# Patient Record
Sex: FEMALE | Race: WHITE | NOT HISPANIC OR LATINO
[De-identification: names, ages, dates, MRNs, and addresses within clinical notes are randomized per-mention and may not be internally consistent; named-entity substitution may affect disease eponyms.]

---

## 2019-03-22 PROBLEM — Z00.00 ENCOUNTER FOR PREVENTIVE HEALTH EXAMINATION: Status: ACTIVE | Noted: 2019-03-22

## 2019-04-15 ENCOUNTER — APPOINTMENT (OUTPATIENT)
Dept: VASCULAR SURGERY | Facility: CLINIC | Age: 79
End: 2019-04-15
Payer: COMMERCIAL

## 2019-04-15 VITALS — HEIGHT: 61 IN | BODY MASS INDEX: 28.32 KG/M2 | WEIGHT: 150 LBS

## 2019-04-15 DIAGNOSIS — Z87.39 PERSONAL HISTORY OF OTHER DISEASES OF THE MUSCULOSKELETAL SYSTEM AND CONNECTIVE TISSUE: ICD-10-CM

## 2019-04-15 DIAGNOSIS — Z78.9 OTHER SPECIFIED HEALTH STATUS: ICD-10-CM

## 2019-04-15 DIAGNOSIS — Z86.79 PERSONAL HISTORY OF OTHER DISEASES OF THE CIRCULATORY SYSTEM: ICD-10-CM

## 2019-04-15 DIAGNOSIS — Z82.61 FAMILY HISTORY OF ARTHRITIS: ICD-10-CM

## 2019-04-15 DIAGNOSIS — Z86.39 PERSONAL HISTORY OF OTHER ENDOCRINE, NUTRITIONAL AND METABOLIC DISEASE: ICD-10-CM

## 2019-04-15 DIAGNOSIS — Z80.9 FAMILY HISTORY OF MALIGNANT NEOPLASM, UNSPECIFIED: ICD-10-CM

## 2019-04-15 DIAGNOSIS — I83.93 ASYMPTOMATIC VARICOSE VEINS OF BILATERAL LOWER EXTREMITIES: ICD-10-CM

## 2019-04-15 PROCEDURE — 99243 OFF/OP CNSLTJ NEW/EST LOW 30: CPT

## 2019-04-15 PROCEDURE — XXXXX: CPT

## 2019-04-15 RX ORDER — ATORVASTATIN CALCIUM 80 MG/1
TABLET, FILM COATED ORAL
Refills: 0 | Status: ACTIVE | COMMUNITY

## 2019-04-15 NOTE — PHYSICAL EXAM
[JVD] : no jugular venous distention  [Normal Breath Sounds] : Normal breath sounds [Ankle Swelling (On Exam)] : not present [Ankle Swelling Bilaterally] : bilaterally  [Varicose Veins Of Lower Extremities] : bilaterally [Ankle Swelling On The Right] : mild [] : bilaterally [Skin Ulcer] : no ulcer [Alert] : alert [Oriented to Person] : oriented to person [Oriented to Place] : oriented to place [Oriented to Time] : oriented to time [de-identified] : Awake and Alert [de-identified] : spider veins bl [de-identified] : appopriate

## 2019-04-15 NOTE — HISTORY OF PRESENT ILLNESS
[FreeTextEntry1] : 79 yo female referred to Dr. Carlton with a chief complaint of spider veins. She reports that she finds them unsightly. She denies pain or swelling associated with her spider veins. She is s/p sclerotherapy many years ago. She is interested in undergoing sclerotherapy. She denies a history of DVT or SVT. She does not currently wear compression stockings.

## 2019-04-15 NOTE — REVIEW OF SYSTEMS
[Fever] : no fever [Chills] : no chills [Limb Pain] : no limb pain [Limb Swelling] : no limb swelling [Limb Weakness] : no limb weakness [Difficulty Walking] : no difficulty walking

## 2019-04-15 NOTE — CONSULT LETTER
[Dear  ___] : Dear ~JESSICA, [Consult Letter:] : I had the pleasure of evaluating your patient, [unfilled]. [Please see my note below.] : Please see my note below. [Consult Closing:] : Thank you very much for allowing me to participate in the care of this patient.  If you have any questions, please do not hesitate to contact me. [Sincerely,] : Sincerely, [FreeTextEntry2] : Marlon Carlton [FreeTextEntry3] : Oliver

## 2019-06-24 ENCOUNTER — APPOINTMENT (OUTPATIENT)
Dept: VASCULAR SURGERY | Facility: CLINIC | Age: 79
End: 2019-06-24
Payer: COMMERCIAL

## 2019-06-24 PROCEDURE — 36468 NJX SCLRSNT SPIDER VEINS: CPT

## 2019-06-24 NOTE — PROCEDURE
[FreeTextEntry1] : bilateral sclerotherapy [FreeTextEntry2] : spider veins [FreeTextEntry3] : After consent was obtained. The patient underwent bilateral lower extremity sclerotherapy with 1% Polidocanol. The Polidocanol was foamed as necessary. The patient tolerated the procedure well. The patient was placed in compression stockings bilaterally. The patient was instructed to wear compression stockings continuously for the next 2 days and on a daily basis for 2 weeks. The patient will followup in 3 -4 weeks sooner if they develop a problem.\par

## 2019-07-22 ENCOUNTER — APPOINTMENT (OUTPATIENT)
Dept: VASCULAR SURGERY | Facility: CLINIC | Age: 79
End: 2019-07-22
Payer: COMMERCIAL

## 2019-07-22 PROCEDURE — 36468 NJX SCLRSNT SPIDER VEINS: CPT

## 2019-07-22 NOTE — PROCEDURE
[FreeTextEntry1] : sclerotherapy [FreeTextEntry2] : spider veins [FreeTextEntry3] : After consent was obtained. The patient underwent bilateral lower extremity sclerotherapy with 1% Polidocanol. The patient tolerated the procedure well. The patient was placed in compression stockings bilaterally. The patient was instructed to wear compression stockings continuously for the next 3 days and on a daily basis for 2 weeks. The patient will followup in 6-8 weeks sooner if they develop a problem.\par

## 2019-08-26 ENCOUNTER — APPOINTMENT (OUTPATIENT)
Dept: VASCULAR SURGERY | Facility: CLINIC | Age: 79
End: 2019-08-26
Payer: COMMERCIAL

## 2019-08-26 PROCEDURE — 36471 NJX SCLRSNT MLT INCMPTNT VN: CPT

## 2019-08-26 NOTE — PROCEDURE
[FreeTextEntry1] : sclerotherapy [FreeTextEntry2] : spider veins [FreeTextEntry3] : After consent was obtained. The patient underwent bilateral lower extremity sclerotherapy with 1% Polidocanol. The patient tolerated the procedure well. The patient was placed in compression stockings bilaterally. The patient was instructed to wear compression stockings continuously for the next 3 days and on a daily basis for 2 weeks. The patient will followup in 4 weeks sooner if they develop a problem for another session of sclerotherapy\par

## 2019-08-26 NOTE — ADDENDUM
[FreeTextEntry1] : Patient tolerated the session well. She verbalized understanding of the post sclerotherapy instructions.

## 2019-09-05 ENCOUNTER — TRANSCRIPTION ENCOUNTER (OUTPATIENT)
Age: 79
End: 2019-09-05

## 2019-10-07 ENCOUNTER — APPOINTMENT (OUTPATIENT)
Dept: VASCULAR SURGERY | Facility: CLINIC | Age: 79
End: 2019-10-07
Payer: COMMERCIAL

## 2019-10-07 PROCEDURE — 36471 NJX SCLRSNT MLT INCMPTNT VN: CPT

## 2019-10-07 NOTE — ADDENDUM
[FreeTextEntry1] : Patient tolerated the procedure well. She verbalized understanding of the post procedure instructions.

## 2019-10-07 NOTE — PROCEDURE
[FreeTextEntry2] : Spider Veins right leg [FreeTextEntry3] : After consent was obtained. The patient underwent right lower extremity sclerotherapy with 1% Polidocanol. The patient tolerated the procedure well. The patient was placed in a compression stockings. The patient was instructed to wear compression stockings continuously for the next 2 days and on a daily basis for 2 weeks. The patient will followup in 2 weeks sooner if they develop a problem.\par  [FreeTextEntry1] : Sclerotherapy

## 2019-10-14 ENCOUNTER — APPOINTMENT (OUTPATIENT)
Dept: VASCULAR SURGERY | Facility: CLINIC | Age: 79
End: 2019-10-14
Payer: COMMERCIAL

## 2019-10-14 VITALS
HEIGHT: 61 IN | HEART RATE: 65 BPM | WEIGHT: 150 LBS | BODY MASS INDEX: 28.32 KG/M2 | SYSTOLIC BLOOD PRESSURE: 119 MMHG | DIASTOLIC BLOOD PRESSURE: 80 MMHG

## 2019-10-14 PROCEDURE — 99212 OFFICE O/P EST SF 10 MIN: CPT

## 2019-10-15 NOTE — HISTORY OF PRESENT ILLNESS
[FreeTextEntry1] : 78 yo female with spider veins. She is s/p multiple treatments. She is wearing compression stockings as directed. She reports that she has some areas of staining post recent treatment. She is here for a follow up evaluation.

## 2019-10-15 NOTE — REVIEW OF SYSTEMS
[Fever] : no fever [Chills] : no chills [Lower Ext Edema] : no lower extremity edema [Limb Pain] : no limb pain [Limb Swelling] : no limb swelling

## 2019-10-15 NOTE — PHYSICAL EXAM
[JVD] : no jugular venous distention  [Normal Breath Sounds] : Normal breath sounds [Normal Rate and Rhythm] : normal rate and rhythm [2+] : left 2+ [Ankle Swelling (On Exam)] : not present [Ankle Swelling Bilaterally] : bilaterally  [Varicose Veins Of Lower Extremities] : bilaterally [Ankle Swelling On The Right] : mild [Alert] : alert [Oriented to Person] : oriented to person [Oriented to Place] : oriented to place [Oriented to Time] : oriented to time [de-identified] : Awake and Alert [de-identified] : spider veins bl improving, some staining, no ulcerations [de-identified] : appropriate

## 2019-10-15 NOTE — ASSESSMENT
[FreeTextEntry1] : 78 yo female s/p multiple sclerotherapy treatments. She is doing well she has some staining on the lateral aspect of the right leg. She will continue to wear compression stockings daily for the next week. She will follow up in several weeks for repeat treatment.\par \par

## 2019-10-28 ENCOUNTER — APPOINTMENT (OUTPATIENT)
Dept: VASCULAR SURGERY | Facility: CLINIC | Age: 79
End: 2019-10-28
Payer: COMMERCIAL

## 2019-10-28 PROCEDURE — 36470 NJX SCLRSNT 1 INCMPTNT VEIN: CPT

## 2019-10-29 NOTE — PROCEDURE
[FreeTextEntry1] : Sclerotherapy left leg [FreeTextEntry2] : spider veins [FreeTextEntry3] : After consent was obtained. The patient underwent left lower extremity sclerotherapy with 0.5% Polidocanol. The patient tolerated the procedure well. The patient was placed in a compression stocking on the left. The patient was instructed to wear a compression stockings continuously for the next 2 days and on a daily basis for 2 weeks. The patient will followup in 4 weeks sooner if they develop a problem.\par

## 2019-11-25 ENCOUNTER — APPOINTMENT (OUTPATIENT)
Dept: VASCULAR SURGERY | Facility: CLINIC | Age: 79
End: 2019-11-25
Payer: COMMERCIAL

## 2019-11-25 PROCEDURE — 36471 NJX SCLRSNT MLT INCMPTNT VN: CPT | Mod: 50,LT,RT

## 2019-11-25 NOTE — PROCEDURE
[FreeTextEntry1] : Sclerotherapy [FreeTextEntry2] : Spider Veins [FreeTextEntry3] : After consent was obtained. The patient underwent bilateral lower extremity sclerotherapy with 0.5% Polidocanol. The patient tolerated the procedure well. The patient was placed in compression stockings bilaterally. The patient was instructed to wear compression stockings continuously for the next 3 days and on a daily basis for 2 weeks. The patient will followup in 6-8 weeks sooner if they develop a problem.\par

## 2020-01-13 ENCOUNTER — APPOINTMENT (OUTPATIENT)
Dept: VASCULAR SURGERY | Facility: CLINIC | Age: 80
End: 2020-01-13
Payer: COMMERCIAL

## 2020-01-13 PROCEDURE — 99213 OFFICE O/P EST LOW 20 MIN: CPT

## 2020-01-13 NOTE — ASSESSMENT
[FreeTextEntry1] : 80 yo female s/p multiple sclerotherapy treatments. She has an excellent result on the left.  She has some staining on the lateral aspect of the right leg and still has some prominent veins on the posterior aspect of the right leg behind the knee. I think she would benefit from an additional session of sclerotherapy. The patient agrees with the plan.\par \par

## 2020-01-13 NOTE — HISTORY OF PRESENT ILLNESS
[FreeTextEntry1] : 78 yo female with spider veins. She is s/p multiple treatments. She is happy with the results on the left. She reports that she has some areas of staining on the right and some continued areas of spider and reticular veins on the right. She is here to discuss additional treatment options.

## 2020-01-13 NOTE — PHYSICAL EXAM
[JVD] : no jugular venous distention  [Normal Breath Sounds] : Normal breath sounds [Normal Rate and Rhythm] : normal rate and rhythm [2+] : right 2+ [Ankle Swelling (On Exam)] : not present [Ankle Swelling Bilaterally] : bilaterally  [Varicose Veins Of Lower Extremities] : bilaterally [Ankle Swelling On The Right] : mild [Alert] : alert [Oriented to Person] : oriented to person [Oriented to Place] : oriented to place [Oriented to Time] : oriented to time [de-identified] : Awake and Alert [de-identified] : spider veins right posterior knee,  some staining right lateral leg no ulcerations [de-identified] : appropriate

## 2020-07-15 ENCOUNTER — RESULT REVIEW (OUTPATIENT)
Age: 80
End: 2020-07-15

## 2021-04-12 ENCOUNTER — APPOINTMENT (OUTPATIENT)
Dept: SURGERY | Facility: CLINIC | Age: 81
End: 2021-04-12
Payer: MEDICARE

## 2021-04-12 VITALS
WEIGHT: 137 LBS | SYSTOLIC BLOOD PRESSURE: 127 MMHG | BODY MASS INDEX: 25.86 KG/M2 | HEIGHT: 61 IN | TEMPERATURE: 97.2 F | HEART RATE: 69 BPM | DIASTOLIC BLOOD PRESSURE: 76 MMHG

## 2021-04-12 PROCEDURE — 99205 OFFICE O/P NEW HI 60 MIN: CPT

## 2021-04-12 RX ORDER — MULTIVITAMIN
TABLET ORAL
Refills: 0 | Status: ACTIVE | COMMUNITY

## 2021-04-12 RX ORDER — ESTRADIOL 0.1 MG/G
0.1 CREAM VAGINAL
Refills: 0 | Status: ACTIVE | COMMUNITY

## 2021-04-12 RX ORDER — BLOOD SUGAR DIAGNOSTIC
100 STRIP MISCELLANEOUS
Refills: 0 | Status: ACTIVE | COMMUNITY

## 2021-04-12 RX ORDER — GLUCOSAMINE SULFATE 500 MG
CAPSULE ORAL
Refills: 0 | Status: ACTIVE | COMMUNITY

## 2021-04-12 RX ORDER — TURMERIC ROOT EXTRACT 500 MG
TABLET ORAL
Refills: 0 | Status: ACTIVE | COMMUNITY

## 2021-04-12 RX ORDER — COLD-HOT PACK
EACH MISCELLANEOUS
Refills: 0 | Status: ACTIVE | COMMUNITY

## 2021-04-12 RX ORDER — PSYLLIUM HUSK 0.4 G
CAPSULE ORAL
Refills: 0 | Status: ACTIVE | COMMUNITY

## 2021-04-12 RX ORDER — ALENDRONATE SODIUM 70 MG/1
70 TABLET ORAL
Refills: 0 | Status: ACTIVE | COMMUNITY

## 2021-04-12 NOTE — CONSULT LETTER
[Dear  ___] : Dear  [unfilled], [( Thank you for referring [unfilled] for consultation for _____ )] : Thank you for referring [unfilled] for consultation for [unfilled] [Please see my note below.] : Please see my note below. [Consult Closing:] : Thank you very much for allowing me to participate in the care of this patient.  If you have any questions, please do not hesitate to contact me. [Sincerely,] : Sincerely, [DrOsito  ___] : Dr. FISCHER [FreeTextEntry3] : Olivia Lawrence MD

## 2021-04-12 NOTE — ASSESSMENT
[FreeTextEntry1] : 81 yo F with mild primary hyperparathyroidism with worsening bone density. \par We discussed the indications for surgery, the anatomy and physiology of the parathyroid glands as well as their pathophysiology at length. We discussed the utility of preoperative localization studies including US, sestamibi scan and 4D CT. We discussed the risks and benefits of surgery including short and long-term. \par \par I have recommended parathyroidectomy with intraoperative PTH monitoring. The patient is in agreement. \par I would like to obtain a 4D CT and possibly NM imaging prior to surgery to better direct my approach.\par Surgery would be performed as an ambulatory (or possibly overnight stay) procedure at [facility].\par Patient will require preop medical clearance and COVID testing as well.

## 2021-04-12 NOTE — PHYSICAL EXAM
[Normal Thyroid] : the thyroid was normal [Respiratory Effort] : normal respiratory effort [Normal Rate and Rhythm] : normal rate and rhythm [No Rash or Lesion] : No rash or lesion [Alert] : alert [Calm] : calm [de-identified] : NAD, well-appearing  [de-identified] : Anicteric, MMM [de-identified] : Bedside ultrasound demonstrated no thyroid nodules; there was a small hypoechoic area posterior to the left lower pole, possibly representing an enlarged parathyroid [de-identified] : soft, nontender, nondistended

## 2021-04-12 NOTE — HISTORY OF PRESENT ILLNESS
[de-identified] : 81 yo F referred by Dr. Arredondo for primary hyperparthyroidism. The patient has been noted to have borderline elevated calcium for ~ 2 years with concurrently elevated or inappropriately high PTH. She tried diet modifications which helped decrease her calcium somewhat but most recent labs on 3/30/21 show a Calcium of 11 and PTH of 67. Vitamin D 25 OH has been on the lower end  of normal. She had 24 hour urine calcium that was elevated at 184 in June 2020. Bone density from 2019 shows osteopenia with T-2.4 in the Lspine, T-1.5 in fem neck and a new T12 compression fracture. She denies kidney stones. She reports some fatigue but overall her energy is improved after she intentionally lost 20 lbs with diet and exercise. She has some constipation and frequent urination but also tried to drink lots of water. She has not noticed issues with her memory apart from what she attributes to normal aging. US 6/2020 showed no evidence of thyroid nodules or parathyroid adenomas.

## 2021-04-26 ENCOUNTER — RESULT REVIEW (OUTPATIENT)
Age: 81
End: 2021-04-26

## 2021-06-17 ENCOUNTER — APPOINTMENT (OUTPATIENT)
Dept: SURGERY | Facility: HOSPITAL | Age: 81
End: 2021-06-17

## 2021-06-28 ENCOUNTER — APPOINTMENT (OUTPATIENT)
Dept: SURGERY | Facility: CLINIC | Age: 81
End: 2021-06-28
Payer: MEDICARE

## 2021-06-28 VITALS
HEIGHT: 61 IN | HEART RATE: 60 BPM | SYSTOLIC BLOOD PRESSURE: 123 MMHG | BODY MASS INDEX: 26.81 KG/M2 | TEMPERATURE: 97.3 F | WEIGHT: 142 LBS | DIASTOLIC BLOOD PRESSURE: 81 MMHG

## 2021-06-28 DIAGNOSIS — E21.0 PRIMARY HYPERPARATHYROIDISM: ICD-10-CM

## 2021-06-28 PROCEDURE — 99024 POSTOP FOLLOW-UP VISIT: CPT

## 2021-06-29 LAB
25(OH)D3 SERPL-MCNC: 49.2 NG/ML
CALCIUM SERPL-MCNC: 9.9 MG/DL
CALCIUM SERPL-MCNC: 9.9 MG/DL
PARATHYROID HORMONE INTACT: 31 PG/ML

## 2021-06-29 NOTE — CONSULT LETTER
[Dear  ___] : Dear  [unfilled], [Courtesy Letter:] : I had the pleasure of seeing your patient, [unfilled], in my office today. [Consult Closing:] : Thank you very much for allowing me to participate in the care of this patient.  If you have any questions, please do not hesitate to contact me. [Sincerely,] : Sincerely, [DrOsito  ___] : Dr. FISCHER [FreeTextEntry1] : Enclosed please find my operative, pathology and postop visit notes below. [FreeTextEntry3] : Olivia Lawrence MD

## 2021-06-29 NOTE — HISTORY OF PRESENT ILLNESS
[de-identified] : Patient returns postop s/p left superior parathyroidectomy on 6/17/21. Her Preop PTH was 71.5 and 10 minutes post excision, PTH was 38.9. She is feeling more energetic. She denies pain, fevers, chills. She denies numbness or tingling. Her incision is healing well.

## 2021-06-29 NOTE — ASSESSMENT
[FreeTextEntry1] : Appropriate recovery s/p parathyroidectomy.\par Will obtain labs today\par Return to normal activity as tolerated\par Follow up with Dr. Arredondo as scheduled\par Follow up with me in 1 month.

## 2021-06-29 NOTE — PHYSICAL EXAM
[Respiratory Effort] : normal respiratory effort [Normal Rate and Rhythm] : normal rate and rhythm [No Rash or Lesion] : No rash or lesion [Alert] : alert [Calm] : calm [de-identified] : NAD, well-appearing [de-identified] : Anicteric, MMM [de-identified] : Well-healing incision with minor residual edema, no evidence of infection

## 2023-01-17 ENCOUNTER — APPOINTMENT (OUTPATIENT)
Dept: INTERNAL MEDICINE | Facility: CLINIC | Age: 83
End: 2023-01-17
Payer: MEDICARE

## 2023-01-17 PROCEDURE — 36415 COLL VENOUS BLD VENIPUNCTURE: CPT

## 2023-01-17 PROCEDURE — G0439: CPT

## 2023-01-27 ENCOUNTER — APPOINTMENT (OUTPATIENT)
Dept: INTERNAL MEDICINE | Facility: CLINIC | Age: 83
End: 2023-01-27
Payer: MEDICARE

## 2023-01-27 PROCEDURE — 90746 HEPB VACCINE 3 DOSE ADULT IM: CPT

## 2023-01-27 PROCEDURE — G0010: CPT

## 2023-01-27 PROCEDURE — 99213 OFFICE O/P EST LOW 20 MIN: CPT

## 2023-01-27 PROCEDURE — 36415 COLL VENOUS BLD VENIPUNCTURE: CPT

## 2023-11-13 ENCOUNTER — APPOINTMENT (OUTPATIENT)
Dept: VASCULAR SURGERY | Facility: CLINIC | Age: 83
End: 2023-11-13
Payer: MEDICARE

## 2023-11-13 VITALS — BODY MASS INDEX: 29.06 KG/M2 | HEIGHT: 59.75 IN | WEIGHT: 148 LBS

## 2023-11-13 PROCEDURE — 99203 OFFICE O/P NEW LOW 30 MIN: CPT

## 2023-12-11 ENCOUNTER — APPOINTMENT (OUTPATIENT)
Dept: VASCULAR SURGERY | Facility: CLINIC | Age: 83
End: 2023-12-11
Payer: SELF-PAY

## 2023-12-11 VITALS
OXYGEN SATURATION: 96 % | DIASTOLIC BLOOD PRESSURE: 83 MMHG | HEART RATE: 63 BPM | RESPIRATION RATE: 18 BRPM | SYSTOLIC BLOOD PRESSURE: 143 MMHG | HEIGHT: 59.75 IN | BODY MASS INDEX: 29.06 KG/M2 | WEIGHT: 148 LBS

## 2023-12-11 PROCEDURE — 36471 NJX SCLRSNT MLT INCMPTNT VN: CPT

## 2024-01-29 ENCOUNTER — APPOINTMENT (OUTPATIENT)
Dept: VASCULAR SURGERY | Facility: CLINIC | Age: 84
End: 2024-01-29
Payer: SELF-PAY

## 2024-01-29 PROCEDURE — 36471 NJX SCLRSNT MLT INCMPTNT VN: CPT

## 2024-01-30 NOTE — ADDENDUM
[FreeTextEntry1] : She tolerated the procedure. She verbalized understanding of the post procedure instructions.

## 2024-01-30 NOTE — PROCEDURE
[FreeTextEntry1] : Sclerotherapy [FreeTextEntry2] : Spider Veins [FreeTextEntry3] : After consent was obtained. The patient underwent bilateral lower extremity sclerotherapy with 0.5% Polidocanol. The patient tolerated the procedure well. The patient was placed in compression stockings bilaterally. The patient was instructed to wear compression stockings continuously for the next 3 days and on a daily basis for 2 weeks. The patient will follow up in 1 month sooner if they develop a problem.

## 2024-04-08 ENCOUNTER — APPOINTMENT (OUTPATIENT)
Dept: VASCULAR SURGERY | Facility: CLINIC | Age: 84
End: 2024-04-08
Payer: SELF-PAY

## 2024-04-08 VITALS — WEIGHT: 145 LBS | BODY MASS INDEX: 28.47 KG/M2 | HEIGHT: 59.75 IN

## 2024-04-08 DIAGNOSIS — I83.93 ASYMPTOMATIC VARICOSE VEINS OF BILATERAL LOWER EXTREMITIES: ICD-10-CM

## 2024-04-08 PROCEDURE — 36471 NJX SCLRSNT MLT INCMPTNT VN: CPT

## 2024-04-08 NOTE — PROCEDURE
[FreeTextEntry1] : Sclerotherapy [FreeTextEntry2] : Spider Veins [FreeTextEntry3] : After consent was obtained. The patient underwent bilateral lower extremity sclerotherapy with 0.5% Polidocanol. The patient tolerated the procedure well. The patient was placed in compression stockings bilaterally. The patient was instructed to wear compression stockings continuously for the next 3 days and on a daily basis for 2 weeks.

## 2024-08-15 ENCOUNTER — RESULT REVIEW (OUTPATIENT)
Age: 84
End: 2024-08-15

## 2024-08-16 ENCOUNTER — RESULT REVIEW (OUTPATIENT)
Age: 84
End: 2024-08-16

## 2024-08-16 ENCOUNTER — TRANSCRIPTION ENCOUNTER (OUTPATIENT)
Age: 84
End: 2024-08-16

## 2024-08-22 ENCOUNTER — APPOINTMENT (OUTPATIENT)
Dept: HEMATOLOGY ONCOLOGY | Facility: CLINIC | Age: 84
End: 2024-08-22
Payer: MEDICARE

## 2024-08-22 VITALS
HEIGHT: 59.75 IN | WEIGHT: 140.2 LBS | SYSTOLIC BLOOD PRESSURE: 132 MMHG | RESPIRATION RATE: 18 BRPM | DIASTOLIC BLOOD PRESSURE: 78 MMHG | OXYGEN SATURATION: 94 % | TEMPERATURE: 97.7 F | BODY MASS INDEX: 27.52 KG/M2 | HEART RATE: 68 BPM

## 2024-08-22 PROBLEM — C85.19 B-CELL LYMPHOMA OF EXTRANODAL SITE: Status: ACTIVE | Noted: 2024-08-22

## 2024-08-22 PROCEDURE — 99215 OFFICE O/P EST HI 40 MIN: CPT

## 2024-08-22 RX ORDER — CHROMIUM 200 MCG
TABLET ORAL
Refills: 0 | Status: ACTIVE | COMMUNITY

## 2024-08-22 RX ORDER — FEXOFENADINE HYDROCHLORIDE 180 MG/1
TABLET ORAL
Refills: 0 | Status: ACTIVE | COMMUNITY

## 2024-08-22 NOTE — REVIEW OF SYSTEMS
[Shortness Of Breath] : shortness of breath [Cough] : cough [SOB on Exertion] : shortness of breath during exertion [Negative] : Allergic/Immunologic

## 2024-08-27 NOTE — HISTORY OF PRESENT ILLNESS
[de-identified] : 83 year old female who presents for a follow up after recent hospitalization.  Patient was admitted to [facility] 8/14/2024-8/16/2024 for complaints of 2-3 weeks of dry cough after endocrinologist Dr. Rashid ordered a CXR revealing pleural effusion. Follow up CT chest performed noting a lung mass. Biopsy and thoracentesis (900 ML removed) performed during admission. Patient is here to review pathology.   CT chest with contrast (8/14/24): Vertically oriented paraspinal or posterior mediastinal soft tissue mass posterior to the esophagus and partial or noncircumferential encasement of the mid to upper aspect of the descending thoracic aorta, part of which measures about 7.5 cm in transverse dimension. There is contiguous extension of the soft tissue mass adjacent to the left pleural surface likely extrapleural. Suggestion of extension of the soft tissue mass into the spinal canal along the left seventh neural foramen. There is mass effect and anterior displacement of some of the mediastinal structures.  1.6 cm retrocrural nodule adjacent to the lower descending thoracic aorta may represent a mildly enlarged lymph node.  Moderate-sized left pleural effusion.  Family HX Maternal mother-bone cancer Maternal aunt-breast CA Maternal uncle-mesothelioma Father-precancerous polyps  Past medical history:   HLD Osteoporosis pre-diabetes  Past surgical history:   partial parathyroidectomy R rotator cuff surgery (14 years ago) R tibial surgery (w/ idris placed)  Social History:  No Smoking; Alcohol - 1 glass of wine a week; No Drugs; Marital Status -   Coded Allergies:        Horse Dander (Verified  Allergy, Severe, TROUBLE BREATHING, 11/4/21)

## 2024-08-27 NOTE — RESULTS/DATA
[FreeTextEntry1] :  CLINICAL INDICATION: LEFT PLEURAL EFFUSION.  Axial CT images of the chest are obtained without intravenous administration of contrast.  No prior chest CTs are available for comparison.  Asymmetric nonspecific 9 mm left lower neck retroclavicular lymph node. No enlarged axillary lymph nodes.  No pericardial effusion. Heart size is normal. Vascular calcifications with involvement of the aorta and the coronary arteries.  Vertically oriented paraspinal or posterior mediastinal soft tissue mass posterior to the esophagus and partial or noncircumferential encasement of the mid to upper aspect of the descending thoracic aorta, part of which measures about 7.5 cm in transverse dimension. There is contiguous extension of the soft tissue mass adjacent to the left pleural surface likely extrapleural. Suggestion of extension of the soft tissue mass into the spinal canal along the left seventh neural foramen. There is mass effect and anterior displacement of some of the mediastinal structures.  1.6 cm retrocrural nodule adjacent to the lower descending thoracic aorta may represent a mildly enlarged lymph node.  Evaluation of the upper abdomen demonstrate small hiatal hernia.  Moderate-sized left pleural effusion.  Evaluation of the lung parenchyma demonstrate left upper lobe linear or subsegmental areas of atelectasis. Left lower lobe partial compressive atelectasis adjacent to the pleural effusion.  Nonspecific 9 mm right middle lobe nodular groundglass opacity.  Two adjacent 2 to 3 mm nodules within the right middle lobe and the right lower lobe adjacent to the right major fissure.  No central endobronchial lesions.  Spinal degenerative changes. Mild-to-moderate superior endplate loss of height of the T12 vertebral body is of indeterminate age.  IMPRESSION:  Large posterior mediastinal/paraspinal soft tissue mass with partial encasement of the mid to upper aspect of the descending thoracic aorta likely of neoplastic etiology. Differential diagnostic considerations include lymphoma.  Suggestion of extension of the soft tissue lesion into the spinal canal as described above. Dedicated thoracic spine MRI is recommended for complete evaluation.  Moderate-sized associated left pleural effusion with adjacent areas of compressive atelectasis.  Nonspecific 9 mm right middle lobe nodular groundglass opacity. A 3 month follow-up noncontrast chest CT is recommended for further evaluation.    Large posterior mediastinal mass between the descending aorta and the thoracic spine measuring approximately 7.2 cm x 1.3 x 8.7 cm without cord compression. There is mild epidural extension on the left at the T8-9 level. There is no bone erosion or significant canal extension. No cord compression. Mild heterogeneous signal involving the C5 vertebral body nonspecific could be related to metastases versus degenerative changes.

## 2024-08-27 NOTE — HISTORY OF PRESENT ILLNESS
[de-identified] : 83 year old female who presents for a follow up after recent hospitalization.  Patient was admitted to [facility] 8/14/2024-8/16/2024 for complaints of 2-3 weeks of dry cough after endocrinologist Dr. Rashid ordered a CXR revealing pleural effusion. Follow up CT chest performed noting a lung mass. Biopsy and thoracentesis (900 ML removed) performed during admission. Patient is here to review pathology.   CT chest with contrast (8/14/24): Vertically oriented paraspinal or posterior mediastinal soft tissue mass posterior to the esophagus and partial or noncircumferential encasement of the mid to upper aspect of the descending thoracic aorta, part of which measures about 7.5 cm in transverse dimension. There is contiguous extension of the soft tissue mass adjacent to the left pleural surface likely extrapleural. Suggestion of extension of the soft tissue mass into the spinal canal along the left seventh neural foramen. There is mass effect and anterior displacement of some of the mediastinal structures.  1.6 cm retrocrural nodule adjacent to the lower descending thoracic aorta may represent a mildly enlarged lymph node.  Moderate-sized left pleural effusion.  Family HX Maternal mother-bone cancer Maternal aunt-breast CA Maternal uncle-mesothelioma Father-precancerous polyps  Past medical history:   HLD Osteoporosis pre-diabetes  Past surgical history:   partial parathyroidectomy R rotator cuff surgery (14 years ago) R tibial surgery (w/ idris placed)  Social History:  No Smoking; Alcohol - 1 glass of wine a week; No Drugs; Marital Status -   Coded Allergies:        Horse Dander (Verified  Allergy, Severe, TROUBLE BREATHING, 11/4/21)

## 2024-08-27 NOTE — ASSESSMENT
[FreeTextEntry1] : 83 year old female with h/o HLD, Osteoporosis, pre-diabetes presents for a follow up after recent hospitalization.  Patient was admitted to [facility] 8/14/2024-8/16/2024 for complaints of 2-3 weeks of dry cough after endocrinologist Dr. Rashid ordered a CXR revealing pleural effusion. Follow up CT chest performed noting a lung mass. Biopsy and thoracentesis (900 ML removed) performed during admission. Patient is here to review pathology.   CT chest with contrast (8/14/24): Vertically oriented paraspinal or posterior mediastinal soft tissue mass posterior to the esophagus and partial or noncircumferential encasement of the mid to upper aspect of the descending thoracic aorta, part of which measures about 7.5 cm in transverse dimension. There is contiguous extension of the soft tissue mass adjacent to the left pleural surface likely extrapleural. Suggestion of extension of the soft tissue mass into the spinal canal along the left seventh neural foramen. There is mass effect and anterior displacement of some of the mediastinal structures.  1.6 cm retrocrural nodule adjacent to the lower descending thoracic aorta may represent a mildly enlarged lymph node.  Moderate-sized left pleural effusion. IMPRESSION: Large posterior mediastinal/paraspinal soft tissue mass with partial encasement of the mid to upper aspect of the descending thoracic aorta likely of neoplastic etiology. Differential diagnostic considerations include lymphoma.  Suggestion of extension of the soft tissue lesion into the spinal canal as described above. Dedicated thoracic spine MRI is recommended for complete evaluation.  Moderate-sized associated left pleural effusion with adjacent areas of compressive atelectasis.  Nonspecific 9 mm right middle lobe nodular groundglass opacity. A 3 month follow-up noncontrast chest CT is recommended for further evaluation.   Plan: -Pt's only symptom was that of a dry cough.  She has no f/ch/NS.  Has mild SOB.   --Pulmonary consultation appreciated.  Cough has resolved with mucinex and hycodan.   -Pt with a large posterior mediastinal mass; had biopsy by IR 8/16/24 and thoracentesis on 8/15/24.  -POSTERIOR MEDIASTINAL MASS, CORE BIOPSY (8//16/24): CD5- CD10- B-cell lymphoma, consistent with marginal zone lymphoma.  The ki-67 index is approximately 30%. -CBC and cmet, calcium all normal (8/16/24).  . -Will send her for urgent PET/CT scan for full staging -TTE (8/15/24): normal EF and normal otherwise also.

## 2024-08-27 NOTE — ASSESSMENT
[FreeTextEntry1] : 83 year old female with h/o HLD, Osteoporosis, pre-diabetes presents for a follow up after recent hospitalization.  Patient was admitted to [facility] 8/14/2024-8/16/2024 for complaints of 2-3 weeks of dry cough after endocrinologist Dr. Rashid ordered a CXR revealing pleural effusion. Follow up CT chest performed noting a lung mass. Biopsy and thoracentesis (900 ML removed) performed during admission. Patient is here to review pathology.   CT chest with contrast (8/14/24): Vertically oriented paraspinal or posterior mediastinal soft tissue mass posterior to the esophagus and partial or noncircumferential encasement of the mid to upper aspect of the descending thoracic aorta, part of which measures about 7.5 cm in transverse dimension. There is contiguous extension of the soft tissue mass adjacent to the left pleural surface likely extrapleural. Suggestion of extension of the soft tissue mass into the spinal canal along the left seventh neural foramen. There is mass effect and anterior displacement of some of the mediastinal structures.  1.6 cm retrocrural nodule adjacent to the lower descending thoracic aorta may represent a mildly enlarged lymph node.  Moderate-sized left pleural effusion. IMPRESSION: Large posterior mediastinal/paraspinal soft tissue mass with partial encasement of the mid to upper aspect of the descending thoracic aorta likely of neoplastic etiology. Differential diagnostic considerations include lymphoma.  Suggestion of extension of the soft tissue lesion into the spinal canal as described above. Dedicated thoracic spine MRI is recommended for complete evaluation.  Moderate-sized associated left pleural effusion with adjacent areas of compressive atelectasis.  Nonspecific 9 mm right middle lobe nodular groundglass opacity. A 3 month follow-up noncontrast chest CT is recommended for further evaluation.   Plan: -Pt's only symptom was that of a dry cough.  She has no f/ch/NS.  Has mild SOB.   --Pulmonary consultation appreciated.  Cough has resolved with mucinex and hycodan.   -Pt with a large posterior mediastinal mass; had biopsy by IR 8/16/24 and thoracentesis on 8/15/24.  -POSTERIOR MEDIASTINAL MASS, CORE BIOPSY (8//16/24): CD5- CD10- B-cell lymphoma, consistent with marginal zone lymphoma.  The ki-67 index is approximately 30%. -CBC and cmet, calcium all normal (8/16/24).  . -Will send her for urgent PET/CT scan for full staging -TTE (8/15/24): normal EF and normal otherwise also.

## 2024-08-30 ENCOUNTER — APPOINTMENT (OUTPATIENT)
Dept: HEMATOLOGY ONCOLOGY | Facility: CLINIC | Age: 84
End: 2024-08-30
Payer: MEDICARE

## 2024-08-30 VITALS
BODY MASS INDEX: 28.44 KG/M2 | RESPIRATION RATE: 18 BRPM | OXYGEN SATURATION: 97 % | HEART RATE: 71 BPM | HEIGHT: 59 IN | TEMPERATURE: 98.1 F | SYSTOLIC BLOOD PRESSURE: 121 MMHG | DIASTOLIC BLOOD PRESSURE: 78 MMHG | WEIGHT: 141.1 LBS

## 2024-08-30 PROCEDURE — 99214 OFFICE O/P EST MOD 30 MIN: CPT

## 2024-08-30 NOTE — ASSESSMENT
[FreeTextEntry1] : 83 year old female with h/o HLD, Osteoporosis, pre-diabetes presents for a follow up after recent hospitalization.  Patient was admitted to [facility] 8/14/2024-8/16/2024 for complaints of 2-3 weeks of dry cough after endocrinologist Dr. Rashid ordered a CXR revealing pleural effusion. Follow up CT chest performed noting a lung mass. Biopsy and thoracentesis (900 ML removed) performed during admission. Patient is here to review pathology.   CT chest with contrast (8/14/24): Vertically oriented paraspinal or posterior mediastinal soft tissue mass posterior to the esophagus and partial or noncircumferential encasement of the mid to upper aspect of the descending thoracic aorta, part of which measures about 7.5 cm in transverse dimension. There is contiguous extension of the soft tissue mass adjacent to the left pleural surface likely extrapleural. Suggestion of extension of the soft tissue mass into the spinal canal along the left seventh neural foramen. There is mass effect and anterior displacement of some of the mediastinal structures.  1.6 cm retrocrural nodule adjacent to the lower descending thoracic aorta may represent a mildly enlarged lymph node.  Moderate-sized left pleural effusion. IMPRESSION: Large posterior mediastinal/paraspinal soft tissue mass with partial encasement of the mid to upper aspect of the descending thoracic aorta likely of neoplastic etiology. Differential diagnostic considerations include lymphoma.  Suggestion of extension of the soft tissue lesion into the spinal canal as described above. Dedicated thoracic spine MRI is recommended for complete evaluation.  Moderate-sized associated left pleural effusion with adjacent areas of compressive atelectasis.  Nonspecific 9 mm right middle lobe nodular groundglass opacity. A 3 month follow-up noncontrast chest CT is recommended for further evaluation.   Plan: -Pt's only symptom was that of a dry cough.  She has no f/ch/NS.  Has mild SOB.   --Pulmonary consultation appreciated.  Cough has resolved with mucinex and hycodan.   -Pt with a large posterior mediastinal mass; had biopsy by IR 8/16/24 and thoracentesis on 8/15/24.  -POSTERIOR MEDIASTINAL MASS, CORE BIOPSY (8//16/24): CD5- CD10- B-cell lymphoma, consistent with marginal zone lymphoma.  The ki-67 index is approximately 30%. -CBC and cmet, calcium all normal (8/16/24).  . -PET/CT (8/28/24):  in the chest, a large solid mass within the posterior mediastinum and left posterior medial chest abutting the thoracic spine, 7.7 cm x 8.4 cm and 10 cm in length, SUV max of 7.3.  The anterior surface abuts perhaps encasing descending thoracic aorta, moderate-large left pleural effusion.  Retrocrurual node, 1.1 cm, SUV max 4.4.  In abd pelvis, RP adenopathy with SUV max of 5.4, 2.7 x 3.7 cm; enlargement of L kidney suggesting perirenal and or parenchymal mass, SUV max 6. -TTE (8/15/24): normal EF and normal otherwise also.    -Will arrange port placement stat and chemo next week. -Need CBC, cmet, hepatitis profile, coags, LDH

## 2024-08-30 NOTE — ASSESSMENT
Mom was calling to get a school excuse because the pt was feeling sick and sent home form school. I informed mom that the pt will have to be seen in clinic in order to receive a school note.    [FreeTextEntry1] : 83 year old female with h/o HLD, Osteoporosis, pre-diabetes presents for a follow up after recent hospitalization.  Patient was admitted to [facility] 8/14/2024-8/16/2024 for complaints of 2-3 weeks of dry cough after endocrinologist Dr. Rashid ordered a CXR revealing pleural effusion. Follow up CT chest performed noting a lung mass. Biopsy and thoracentesis (900 ML removed) performed during admission. Patient is here to review pathology.   CT chest with contrast (8/14/24): Vertically oriented paraspinal or posterior mediastinal soft tissue mass posterior to the esophagus and partial or noncircumferential encasement of the mid to upper aspect of the descending thoracic aorta, part of which measures about 7.5 cm in transverse dimension. There is contiguous extension of the soft tissue mass adjacent to the left pleural surface likely extrapleural. Suggestion of extension of the soft tissue mass into the spinal canal along the left seventh neural foramen. There is mass effect and anterior displacement of some of the mediastinal structures.  1.6 cm retrocrural nodule adjacent to the lower descending thoracic aorta may represent a mildly enlarged lymph node.  Moderate-sized left pleural effusion. IMPRESSION: Large posterior mediastinal/paraspinal soft tissue mass with partial encasement of the mid to upper aspect of the descending thoracic aorta likely of neoplastic etiology. Differential diagnostic considerations include lymphoma.  Suggestion of extension of the soft tissue lesion into the spinal canal as described above. Dedicated thoracic spine MRI is recommended for complete evaluation.  Moderate-sized associated left pleural effusion with adjacent areas of compressive atelectasis.  Nonspecific 9 mm right middle lobe nodular groundglass opacity. A 3 month follow-up noncontrast chest CT is recommended for further evaluation.   Plan: -Pt's only symptom was that of a dry cough.  She has no f/ch/NS.  Has mild SOB.   --Pulmonary consultation appreciated.  Cough has resolved with mucinex and hycodan.   -Pt with a large posterior mediastinal mass; had biopsy by IR 8/16/24 and thoracentesis on 8/15/24.  -POSTERIOR MEDIASTINAL MASS, CORE BIOPSY (8//16/24): CD5- CD10- B-cell lymphoma, consistent with marginal zone lymphoma.  The ki-67 index is approximately 30%. -CBC and cmet, calcium all normal (8/16/24).  . -PET/CT (8/28/24):  in the chest, a large solid mass within the posterior mediastinum and left posterior medial chest abutting the thoracic spine, 7.7 cm x 8.4 cm and 10 cm in length, SUV max of 7.3.  The anterior surface abuts perhaps encasing descending thoracic aorta, moderate-large left pleural effusion.  Retrocrurual node, 1.1 cm, SUV max 4.4.  In abd pelvis, RP adenopathy with SUV max of 5.4, 2.7 x 3.7 cm; enlargement of L kidney suggesting perirenal and or parenchymal mass, SUV max 6. -TTE (8/15/24): normal EF and normal otherwise also.    -Will arrange port placement stat and chemo next week. -Need CBC, cmet, hepatitis profile, coags, LDH

## 2024-08-30 NOTE — ASSESSMENT
[FreeTextEntry1] : 83 year old female with h/o HLD, Osteoporosis, pre-diabetes presents for a follow up after recent hospitalization.  Patient was admitted to [facility] 8/14/2024-8/16/2024 for complaints of 2-3 weeks of dry cough after endocrinologist Dr. Rashid ordered a CXR revealing pleural effusion. Follow up CT chest performed noting a lung mass. Biopsy and thoracentesis (900 ML removed) performed during admission. Patient is here to review pathology.   CT chest with contrast (8/14/24): Vertically oriented paraspinal or posterior mediastinal soft tissue mass posterior to the esophagus and partial or noncircumferential encasement of the mid to upper aspect of the descending thoracic aorta, part of which measures about 7.5 cm in transverse dimension. There is contiguous extension of the soft tissue mass adjacent to the left pleural surface likely extrapleural. Suggestion of extension of the soft tissue mass into the spinal canal along the left seventh neural foramen. There is mass effect and anterior displacement of some of the mediastinal structures.  1.6 cm retrocrural nodule adjacent to the lower descending thoracic aorta may represent a mildly enlarged lymph node.  Moderate-sized left pleural effusion. IMPRESSION: Large posterior mediastinal/paraspinal soft tissue mass with partial encasement of the mid to upper aspect of the descending thoracic aorta likely of neoplastic etiology. Differential diagnostic considerations include lymphoma.  Suggestion of extension of the soft tissue lesion into the spinal canal as described above. Dedicated thoracic spine MRI is recommended for complete evaluation.  Moderate-sized associated left pleural effusion with adjacent areas of compressive atelectasis.  Nonspecific 9 mm right middle lobe nodular groundglass opacity. A 3 month follow-up noncontrast chest CT is recommended for further evaluation.   Plan: -Pt's only symptom was that of a dry cough.  She has no f/ch/NS.  Has mild SOB.   --Pulmonary consultation appreciated.  Cough has resolved with mucinex and hycodan.   -Pt with a large posterior mediastinal mass; had biopsy by IR 8/16/24 and thoracentesis on 8/15/24.  -POSTERIOR MEDIASTINAL MASS, CORE BIOPSY (8//16/24): CD5- CD10- B-cell lymphoma, consistent with marginal zone lymphoma.  The ki-67 index is approximately 30%. -CBC and cmet, calcium all normal (8/16/24).  . -PET/CT (8/28/24):  in the chest, a large solid mass within the posterior mediastinum and left posterior medial chest abutting the thoracic spine, 7.7 cm x 8.4 cm and 10 cm in length, SUV max of 7.3.  The anterior surface abuts perhaps encasing descending thoracic aorta, moderate-large left pleural effusion.  Retrocrurual node, 1.1 cm, SUV max 4.4.  In abd pelvis, RP adenopathy with SUV max of 5.4, 2.7 x 3.7 cm; enlargement of L kidney suggesting perirenal and or parenchymal mass, SUV max 6. -TTE (8/15/24): normal EF and normal otherwise also.    -Will arrange port placement stat and chemo next week. -Need CBC, cmet, hepatitis profile, coags, LDH

## 2024-08-30 NOTE — HISTORY OF PRESENT ILLNESS
[de-identified] : 83 year old female who presents for a follow up after recent hospitalization.  Patient was admitted to [facility] 8/14/2024-8/16/2024 for complaints of 2-3 weeks of dry cough after endocrinologist Dr. Rashid ordered a CXR revealing pleural effusion. Follow up CT chest performed noting a lung mass. Biopsy and thoracentesis (900 ML removed) performed during admission. Patient is here to review pathology.   CT chest with contrast (8/14/24): Vertically oriented paraspinal or posterior mediastinal soft tissue mass posterior to the esophagus and partial or noncircumferential encasement of the mid to upper aspect of the descending thoracic aorta, part of which measures about 7.5 cm in transverse dimension. There is contiguous extension of the soft tissue mass adjacent to the left pleural surface likely extrapleural. Suggestion of extension of the soft tissue mass into the spinal canal along the left seventh neural foramen. There is mass effect and anterior displacement of some of the mediastinal structures.  1.6 cm retrocrural nodule adjacent to the lower descending thoracic aorta may represent a mildly enlarged lymph node.  Moderate-sized left pleural effusion.  Family HX Maternal mother-bone cancer Maternal aunt-breast CA Maternal uncle-mesothelioma Father-precancerous polyps  Past medical history:   HLD Osteoporosis pre-diabetes  Past surgical history:   partial parathyroidectomy R rotator cuff surgery (14 years ago) R tibial surgery (w/ idris placed)  Social History:  No Smoking; Alcohol - 1 glass of wine a week; No Drugs; Marital Status -   Coded Allergies:        Horse Dander (Verified  Allergy, Severe, TROUBLE BREATHING, 11/4/21)  [de-identified] : Patient presents today for follow up and to review PET scan. Patient overall feeling well. Still has persistent dry cough; not productive.  Denies any pain.

## 2024-08-30 NOTE — HISTORY OF PRESENT ILLNESS
[de-identified] : 83 year old female who presents for a follow up after recent hospitalization.  Patient was admitted to [facility] 8/14/2024-8/16/2024 for complaints of 2-3 weeks of dry cough after endocrinologist Dr. Rashid ordered a CXR revealing pleural effusion. Follow up CT chest performed noting a lung mass. Biopsy and thoracentesis (900 ML removed) performed during admission. Patient is here to review pathology.   CT chest with contrast (8/14/24): Vertically oriented paraspinal or posterior mediastinal soft tissue mass posterior to the esophagus and partial or noncircumferential encasement of the mid to upper aspect of the descending thoracic aorta, part of which measures about 7.5 cm in transverse dimension. There is contiguous extension of the soft tissue mass adjacent to the left pleural surface likely extrapleural. Suggestion of extension of the soft tissue mass into the spinal canal along the left seventh neural foramen. There is mass effect and anterior displacement of some of the mediastinal structures.  1.6 cm retrocrural nodule adjacent to the lower descending thoracic aorta may represent a mildly enlarged lymph node.  Moderate-sized left pleural effusion.  Family HX Maternal mother-bone cancer Maternal aunt-breast CA Maternal uncle-mesothelioma Father-precancerous polyps  Past medical history:   HLD Osteoporosis pre-diabetes  Past surgical history:   partial parathyroidectomy R rotator cuff surgery (14 years ago) R tibial surgery (w/ idris placed)  Social History:  No Smoking; Alcohol - 1 glass of wine a week; No Drugs; Marital Status -   Coded Allergies:        Horse Dander (Verified  Allergy, Severe, TROUBLE BREATHING, 11/4/21)  [de-identified] : Patient presents today for follow up and to review PET scan. Patient overall feeling well. Still has persistent dry cough; not productive.  Denies any pain.

## 2024-08-30 NOTE — REVIEW OF SYSTEMS
[Shortness Of Breath] : shortness of breath [Cough] : cough [SOB on Exertion] : shortness of breath during exertion [Negative] : Allergic/Immunologic [Fatigue] : no fatigue [Abdominal Pain] : no abdominal pain [Dysuria] : no dysuria [Confused] : no confusion [Dizziness] : no dizziness [FreeTextEntry6] : continues to have dry cough

## 2024-08-30 NOTE — HISTORY OF PRESENT ILLNESS
[de-identified] : 83 year old female who presents for a follow up after recent hospitalization.  Patient was admitted to [facility] 8/14/2024-8/16/2024 for complaints of 2-3 weeks of dry cough after endocrinologist Dr. Rashid ordered a CXR revealing pleural effusion. Follow up CT chest performed noting a lung mass. Biopsy and thoracentesis (900 ML removed) performed during admission. Patient is here to review pathology.   CT chest with contrast (8/14/24): Vertically oriented paraspinal or posterior mediastinal soft tissue mass posterior to the esophagus and partial or noncircumferential encasement of the mid to upper aspect of the descending thoracic aorta, part of which measures about 7.5 cm in transverse dimension. There is contiguous extension of the soft tissue mass adjacent to the left pleural surface likely extrapleural. Suggestion of extension of the soft tissue mass into the spinal canal along the left seventh neural foramen. There is mass effect and anterior displacement of some of the mediastinal structures.  1.6 cm retrocrural nodule adjacent to the lower descending thoracic aorta may represent a mildly enlarged lymph node.  Moderate-sized left pleural effusion.  Family HX Maternal mother-bone cancer Maternal aunt-breast CA Maternal uncle-mesothelioma Father-precancerous polyps  Past medical history:   HLD Osteoporosis pre-diabetes  Past surgical history:   partial parathyroidectomy R rotator cuff surgery (14 years ago) R tibial surgery (w/ idris placed)  Social History:  No Smoking; Alcohol - 1 glass of wine a week; No Drugs; Marital Status -   Coded Allergies:        Horse Dander (Verified  Allergy, Severe, TROUBLE BREATHING, 11/4/21)  [de-identified] : Patient presents today for follow up and to review PET scan. Patient overall feeling well. Still has persistent dry cough; not productive.  Denies any pain.

## 2024-08-31 ENCOUNTER — NON-APPOINTMENT (OUTPATIENT)
Age: 84
End: 2024-08-31

## 2024-08-31 RX ORDER — ONDANSETRON 4 MG/1
4 TABLET ORAL
Qty: 30 | Refills: 0 | Status: ACTIVE | COMMUNITY
Start: 2024-08-31 | End: 1900-01-01

## 2024-08-31 RX ORDER — PROCHLORPERAZINE MALEATE 5 MG/1
5 TABLET ORAL EVERY 4 HOURS
Qty: 30 | Refills: 0 | Status: ACTIVE | COMMUNITY
Start: 2024-08-31 | End: 1900-01-01

## 2024-08-31 RX ORDER — ALLOPURINOL 300 MG/1
300 TABLET ORAL TWICE DAILY
Qty: 30 | Refills: 0 | Status: ACTIVE | COMMUNITY
Start: 2024-08-31 | End: 1900-01-01

## 2024-09-03 ENCOUNTER — RESULT REVIEW (OUTPATIENT)
Age: 84
End: 2024-09-03

## 2024-09-03 ENCOUNTER — LABORATORY RESULT (OUTPATIENT)
Age: 84
End: 2024-09-03

## 2024-09-03 ENCOUNTER — APPOINTMENT (OUTPATIENT)
Dept: HEMATOLOGY ONCOLOGY | Facility: CLINIC | Age: 84
End: 2024-09-03

## 2024-09-03 VITALS
BODY MASS INDEX: 28.39 KG/M2 | RESPIRATION RATE: 18 BRPM | HEART RATE: 69 BPM | TEMPERATURE: 97.8 F | OXYGEN SATURATION: 93 % | HEIGHT: 59 IN | SYSTOLIC BLOOD PRESSURE: 134 MMHG | WEIGHT: 140.8 LBS | DIASTOLIC BLOOD PRESSURE: 78 MMHG

## 2024-09-04 ENCOUNTER — RESULT REVIEW (OUTPATIENT)
Age: 84
End: 2024-09-04

## 2024-09-05 ENCOUNTER — APPOINTMENT (OUTPATIENT)
Dept: HEMATOLOGY ONCOLOGY | Facility: CLINIC | Age: 84
End: 2024-09-05
Payer: MEDICARE

## 2024-09-05 DIAGNOSIS — C85.19 UNSPECIFIED B-CELL LYMPHOMA, EXTRANODAL AND SOLID ORGAN SITES: ICD-10-CM

## 2024-09-05 PROCEDURE — 99215 OFFICE O/P EST HI 40 MIN: CPT

## 2024-09-05 RX ORDER — ACYCLOVIR 400 MG/1
400 TABLET ORAL TWICE DAILY
Qty: 180 | Refills: 2 | Status: ACTIVE | COMMUNITY
Start: 2024-09-05 | End: 1900-01-01

## 2024-09-05 RX ORDER — SULFAMETHOXAZOLE AND TRIMETHOPRIM 400; 80 MG/1; MG/1
400-80 TABLET ORAL DAILY
Qty: 90 | Refills: 2 | Status: ACTIVE | COMMUNITY
Start: 2024-09-05 | End: 1900-01-01

## 2024-09-06 NOTE — HISTORY OF PRESENT ILLNESS
[de-identified] : 83 year old female who presents for a follow up after recent hospitalization.  Patient was admitted to [facility] 8/14/2024-8/16/2024 for complaints of 2-3 weeks of dry cough after endocrinologist Dr. Rashid ordered a CXR revealing pleural effusion. Follow up CT chest performed noting a lung mass. Biopsy and thoracentesis (900 ML removed) performed during admission. Patient is here to review pathology.   CT chest with contrast (8/14/24): Vertically oriented paraspinal or posterior mediastinal soft tissue mass posterior to the esophagus and partial or noncircumferential encasement of the mid to upper aspect of the descending thoracic aorta, part of which measures about 7.5 cm in transverse dimension. There is contiguous extension of the soft tissue mass adjacent to the left pleural surface likely extrapleural. Suggestion of extension of the soft tissue mass into the spinal canal along the left seventh neural foramen. There is mass effect and anterior displacement of some of the mediastinal structures.  1.6 cm retrocrural nodule adjacent to the lower descending thoracic aorta may represent a mildly enlarged lymph node.  Moderate-sized left pleural effusion.  Family HX Maternal mother-bone cancer Maternal aunt-breast CA Maternal uncle-mesothelioma Father-precancerous polyps  Past medical history:   HLD Osteoporosis pre-diabetes  Past surgical history:   partial parathyroidectomy R rotator cuff surgery (14 years ago) R tibial surgery (w/ idris placed)  Social History:  No Smoking; Alcohol - 1 glass of wine a week; No Drugs; Marital Status -   Coded Allergies:        Horse Dander (Verified Allergy, Severe, TROUBLE BREATHING, 11/4/21)  [de-identified] : Patient presents today for follow up and to review PET scan. Patient overall feeling well. Still has persistent dry cough; not productive.  Denies any pain.

## 2024-09-06 NOTE — HISTORY OF PRESENT ILLNESS
[de-identified] : 83 year old female who presents for a follow up after recent hospitalization.  Patient was admitted to [facility] 8/14/2024-8/16/2024 for complaints of 2-3 weeks of dry cough after endocrinologist Dr. Rashid ordered a CXR revealing pleural effusion. Follow up CT chest performed noting a lung mass. Biopsy and thoracentesis (900 ML removed) performed during admission. Patient is here to review pathology.   CT chest with contrast (8/14/24): Vertically oriented paraspinal or posterior mediastinal soft tissue mass posterior to the esophagus and partial or noncircumferential encasement of the mid to upper aspect of the descending thoracic aorta, part of which measures about 7.5 cm in transverse dimension. There is contiguous extension of the soft tissue mass adjacent to the left pleural surface likely extrapleural. Suggestion of extension of the soft tissue mass into the spinal canal along the left seventh neural foramen. There is mass effect and anterior displacement of some of the mediastinal structures.  1.6 cm retrocrural nodule adjacent to the lower descending thoracic aorta may represent a mildly enlarged lymph node.  Moderate-sized left pleural effusion.  Family HX Maternal mother-bone cancer Maternal aunt-breast CA Maternal uncle-mesothelioma Father-precancerous polyps  Past medical history:   HLD Osteoporosis pre-diabetes  Past surgical history:   partial parathyroidectomy R rotator cuff surgery (14 years ago) R tibial surgery (w/ idris placed)  Social History:  No Smoking; Alcohol - 1 glass of wine a week; No Drugs; Marital Status -   Coded Allergies:        Horse Dander (Verified Allergy, Severe, TROUBLE BREATHING, 11/4/21)  [de-identified] : Patient presents today for follow up and to review PET scan. Patient overall feeling well. Still has persistent dry cough; not productive.  Denies any pain.

## 2024-09-06 NOTE — ASSESSMENT
[FreeTextEntry1] : 83 year old female with h/o HLD, Osteoporosis, pre-diabetes presents for a follow up after recent hospitalization.  Patient was admitted to [facility] 8/14/2024-8/16/2024 for complaints of 2-3 weeks of dry cough after endocrinologist Dr. Rashid ordered a CXR revealing pleural effusion. Follow up CT chest performed noting a lung mass. Biopsy and thoracentesis (900 ML removed) performed during admission. Patient is here to review pathology.   CT chest with contrast (8/14/24): Vertically oriented paraspinal or posterior mediastinal soft tissue mass posterior to the esophagus and partial or noncircumferential encasement of the mid to upper aspect of the descending thoracic aorta, part of which measures about 7.5 cm in transverse dimension. There is contiguous extension of the soft tissue mass adjacent to the left pleural surface likely extrapleural. Suggestion of extension of the soft tissue mass into the spinal canal along the left seventh neural foramen. There is mass effect and anterior displacement of some of the mediastinal structures.  1.6 cm retrocrural nodule adjacent to the lower descending thoracic aorta may represent a mildly enlarged lymph node.  Moderate-sized left pleural effusion. IMPRESSION: Large posterior mediastinal/paraspinal soft tissue mass with partial encasement of the mid to upper aspect of the descending thoracic aorta likely of neoplastic etiology. Differential diagnostic considerations include lymphoma.  Suggestion of extension of the soft tissue lesion into the spinal canal as described above. Dedicated thoracic spine MRI is recommended for complete evaluation.  Moderate-sized associated left pleural effusion with adjacent areas of compressive atelectasis.  Nonspecific 9 mm right middle lobe nodular groundglass opacity. A 3 month follow-up noncontrast chest CT is recommended for further evaluation.   Plan: -Pt's only symptom was that of a dry cough.  She has no f/ch/NS.  Has mild SOB.   --Pulmonary consultation appreciated.  Cough has resolved with mucinex and hycodan.   -Pt with a large posterior mediastinal mass; had biopsy by IR 8/16/24 and thoracentesis on 8/15/24.  -POSTERIOR MEDIASTINAL MASS, CORE BIOPSY (8//16/24): CD5- CD10- B-cell lymphoma, consistent with marginal zone lymphoma.  The ki-67 index is approximately 30%. -CBC and cmet, calcium all normal (8/16/24).  . -PET/CT (8/28/24):  in the chest, a large solid mass within the posterior mediastinum and left posterior medial chest abutting the thoracic spine, 7.7 cm x 8.4 cm and 10 cm in length, SUV max of 7.3.  The anterior surface abuts perhaps encasing descending thoracic aorta, moderate-large left pleural effusion.  Retrocrurual node, 1.1 cm, SUV max 4.4.  In abd pelvis, RP adenopathy with SUV max of 5.4, 2.7 x 3.7 cm; enlargement of L kidney suggesting perirenal and or parenchymal mass, SUV max 6. -TTE (8/15/24): normal EF and normal otherwise also.    -Had port placement -Will start Rituxan 375 mg/m2 along with Bendamustine 70 mg/m2 day 1,2 q28 days.  With Neulasta on day 3 after every cycle.  Side effects including but not limited to cytopenias, immunosuppression, neuropathy, nausea/vomiting, bowel habit changes, fatigue and severe, serious reactions/adverse effects that can be life threatening were discussed. Pt agreeable to tx and chemo consent signed (9/5/24).   -Contin allopurinol.   -To start on Bactrim SS daily (pt's have more compliance with this instead of DS TIW dosing) and acyclovir for infection prophylaxis.  -Anti-emetic meds put in PRN.    -Need CBC, cmet, uric acid, magnesium, phosphorus at each visit.

## 2024-09-06 NOTE — ASSESSMENT
[FreeTextEntry1] : 83 year old female with h/o HLD, Osteoporosis, pre-diabetes presents for a follow up after recent hospitalization.  Patient was admitted to [facility] 8/14/2024-8/16/2024 for complaints of 2-3 weeks of dry cough after endocrinologist Dr. Rashid ordered a CXR revealing pleural effusion. Follow up CT chest performed noting a lung mass. Biopsy and thoracentesis (900 ML removed) performed during admission. Patient is here to review pathology.   CT chest with contrast (8/14/24): Vertically oriented paraspinal or posterior mediastinal soft tissue mass posterior to the esophagus and partial or noncircumferential encasement of the mid to upper aspect of the descending thoracic aorta, part of which measures about 7.5 cm in transverse dimension. There is contiguous extension of the soft tissue mass adjacent to the left pleural surface likely extrapleural. Suggestion of extension of the soft tissue mass into the spinal canal along the left seventh neural foramen. There is mass effect and anterior displacement of some of the mediastinal structures.  1.6 cm retrocrural nodule adjacent to the lower descending thoracic aorta may represent a mildly enlarged lymph node.  Moderate-sized left pleural effusion. IMPRESSION: Large posterior mediastinal/paraspinal soft tissue mass with partial encasement of the mid to upper aspect of the descending thoracic aorta likely of neoplastic etiology. Differential diagnostic considerations include lymphoma.  Suggestion of extension of the soft tissue lesion into the spinal canal as described above. Dedicated thoracic spine MRI is recommended for complete evaluation.  Moderate-sized associated left pleural effusion with adjacent areas of compressive atelectasis.  Nonspecific 9 mm right middle lobe nodular groundglass opacity. A 3 month follow-up noncontrast chest CT is recommended for further evaluation.   Plan: -Pt's only symptom was that of a dry cough.  She has no f/ch/NS.  Has mild SOB.   --Pulmonary consultation appreciated.  Cough has resolved with mucinex and hycodan.   -Pt with a large posterior mediastinal mass; had biopsy by IR 8/16/24 and thoracentesis on 8/15/24.  -POSTERIOR MEDIASTINAL MASS, CORE BIOPSY (8//16/24): CD5- CD10- B-cell lymphoma, consistent with marginal zone lymphoma.  The ki-67 index is approximately 30%. -CBC and cmet, calcium all normal (8/16/24).  . -PET/CT (8/28/24):  in the chest, a large solid mass within the posterior mediastinum and left posterior medial chest abutting the thoracic spine, 7.7 cm x 8.4 cm and 10 cm in length, SUV max of 7.3.  The anterior surface abuts perhaps encasing descending thoracic aorta, moderate-large left pleural effusion.  Retrocrurual node, 1.1 cm, SUV max 4.4.  In abd pelvis, RP adenopathy with SUV max of 5.4, 2.7 x 3.7 cm; enlargement of L kidney suggesting perirenal and or parenchymal mass, SUV max 6. -TTE (8/15/24): normal EF and normal otherwise also.    -Had port placement -Will start Rituxan 375 mg/m2 along with Bendamustine 70 mg/m2 day 1,2 q28 days.  With Neulasta on day 3 after every cycle.  Side effects including but not limited to cytopenias, immunosuppression, neuropathy, nausea/vomiting, bowel habit changes, fatigue and severe, serious reactions/adverse effects that can be life threatening were discussed. Pt agreeable to tx and chemo consent signed (9/5/24).   -Contin allopurinol.   -To start on Bactrim SS daily (pt's have more compliance with this instead of DS TIW dosing) and acyclovir for infection prophylaxis.  -Anti-emetic meds put in PRN.    -Need CBC, cmet, uric acid, magnesium, phosphorus at each visit.

## 2024-09-13 ENCOUNTER — NON-APPOINTMENT (OUTPATIENT)
Age: 84
End: 2024-09-13

## 2024-09-16 ENCOUNTER — APPOINTMENT (OUTPATIENT)
Dept: HEMATOLOGY ONCOLOGY | Facility: CLINIC | Age: 84
End: 2024-09-16
Payer: MEDICARE

## 2024-09-16 ENCOUNTER — RESULT REVIEW (OUTPATIENT)
Age: 84
End: 2024-09-16

## 2024-09-16 VITALS
HEIGHT: 59 IN | TEMPERATURE: 98 F | HEART RATE: 83 BPM | RESPIRATION RATE: 18 BRPM | OXYGEN SATURATION: 97 % | BODY MASS INDEX: 27.78 KG/M2 | DIASTOLIC BLOOD PRESSURE: 76 MMHG | SYSTOLIC BLOOD PRESSURE: 129 MMHG | WEIGHT: 137.8 LBS

## 2024-09-16 PROCEDURE — 99214 OFFICE O/P EST MOD 30 MIN: CPT

## 2024-09-16 RX ORDER — ALLOPURINOL 300 MG/1
300 TABLET ORAL TWICE DAILY
Qty: 60 | Refills: 3 | Status: ACTIVE | COMMUNITY
Start: 2024-09-16 | End: 1900-01-01

## 2024-09-20 NOTE — ASSESSMENT
[With Patient/Caregiver] : With Patient/Caregiver [FreeTextEntry1] : 83 year old female with h/o HLD, Osteoporosis, pre-diabetes presents for a follow up after recent hospitalization.  Patient was admitted to [facility] 8/14/2024-8/16/2024 for complaints of 2-3 weeks of dry cough after endocrinologist Dr. Rashid ordered a CXR revealing pleural effusion. Follow up CT chest performed noting a lung mass. Biopsy and thoracentesis (900 ML removed) performed during admission. Patient is here to review pathology.   CT chest with contrast (8/14/24): Vertically oriented paraspinal or posterior mediastinal soft tissue mass posterior to the esophagus and partial or noncircumferential encasement of the mid to upper aspect of the descending thoracic aorta, part of which measures about 7.5 cm in transverse dimension. There is contiguous extension of the soft tissue mass adjacent to the left pleural surface likely extrapleural. Suggestion of extension of the soft tissue mass into the spinal canal along the left seventh neural foramen. There is mass effect and anterior displacement of some of the mediastinal structures.  1.6 cm retrocrural nodule adjacent to the lower descending thoracic aorta may represent a mildly enlarged lymph node.  Moderate-sized left pleural effusion. IMPRESSION: Large posterior mediastinal/paraspinal soft tissue mass with partial encasement of the mid to upper aspect of the descending thoracic aorta likely of neoplastic etiology. Differential diagnostic considerations include lymphoma.  Suggestion of extension of the soft tissue lesion into the spinal canal as described above. Dedicated thoracic spine MRI is recommended for complete evaluation.  Moderate-sized associated left pleural effusion with adjacent areas of compressive atelectasis.  Nonspecific 9 mm right middle lobe nodular groundglass opacity. A 3 month follow-up noncontrast chest CT is recommended for further evaluation.   Plan: -Pt's only symptom was that of a dry cough.  She has no f/ch/NS.  Has mild SOB.   --Pulmonary consultation appreciated.  Cough has resolved with mucinex and hycodan.   -Pt with a large posterior mediastinal mass; had biopsy by IR 8/16/24 and thoracentesis on 8/15/24.  -POSTERIOR MEDIASTINAL MASS, CORE BIOPSY (8//16/24): CD5- CD10- B-cell lymphoma, consistent with marginal zone lymphoma.  The ki-67 index is approximately 30%. -CBC and cmet, calcium all normal (8/16/24).  . -PET/CT (8/28/24):  in the chest, a large solid mass within the posterior mediastinum and left posterior medial chest abutting the thoracic spine, 7.7 cm x 8.4 cm and 10 cm in length, SUV max of 7.3.  The anterior surface abuts perhaps encasing descending thoracic aorta, moderate-large left pleural effusion.  Retrocrurual node, 1.1 cm, SUV max 4.4.  In abd pelvis, RP adenopathy with SUV max of 5.4, 2.7 x 3.7 cm; enlargement of L kidney suggesting perirenal and or parenchymal mass, SUV max 6. -TTE (8/15/24): normal EF and normal otherwise also.    -Had port placement -Will start Rituxan 375 mg/m2 along with Bendamustine 70 mg/m2 day 1,2 q28 days.  With Neulasta on day 3 after every cycle.  Side effects including but not limited to cytopenias, immunosuppression, neuropathy, nausea/vomiting, bowel habit changes, fatigue and severe, serious reactions/adverse effects that can be life threatening were discussed. Pt agreeable to tx and chemo consent signed (9/5/24).   -Contin allopurinol.   -To start on Bactrim SS daily (pt's have more compliance with this instead of DS TIW dosing) and acyclovir for infection prophylaxis.  -Anti-emetic meds put in PRN.    -Cycle 1 R-Mike given on 9/6/24.   -Rituxan 375 mg/m2 and Mike 70 mg/m2 day 1, 2 q28 d -Next cycle due on Oct 4th.   -Need CBC, cmet, uric acid, magnesium, phosphorus at each visit.     [AdvancecareDate] : 09/16/24

## 2024-09-20 NOTE — HISTORY OF PRESENT ILLNESS
[de-identified] : 83 year old female who presents for a follow up after recent hospitalization.  Patient was admitted to [facility] 8/14/2024-8/16/2024 for complaints of 2-3 weeks of dry cough after endocrinologist Dr. Rashid ordered a CXR revealing pleural effusion. Follow up CT chest performed noting a lung mass. Biopsy and thoracentesis (900 ML removed) performed during admission. Patient is here to review pathology.   CT chest with contrast (8/14/24): Vertically oriented paraspinal or posterior mediastinal soft tissue mass posterior to the esophagus and partial or noncircumferential encasement of the mid to upper aspect of the descending thoracic aorta, part of which measures about 7.5 cm in transverse dimension. There is contiguous extension of the soft tissue mass adjacent to the left pleural surface likely extrapleural. Suggestion of extension of the soft tissue mass into the spinal canal along the left seventh neural foramen. There is mass effect and anterior displacement of some of the mediastinal structures.  1.6 cm retrocrural nodule adjacent to the lower descending thoracic aorta may represent a mildly enlarged lymph node.  Moderate-sized left pleural effusion.  Family HX Maternal mother-bone cancer Maternal aunt-breast CA Maternal uncle-mesothelioma Father-precancerous polyps  Past medical history:   HLD Osteoporosis pre-diabetes  Past surgical history:   partial parathyroidectomy R rotator cuff surgery (14 years ago) R tibial surgery (w/ idris placed)  Social History:  No Smoking; Alcohol - 1 glass of wine a week; No Drugs; Marital Status -   Coded Allergies:        Horse Dander (Verified Allergy, Severe, TROUBLE BREATHING, 11/4/21)  [de-identified] : Patient presents today for follow up. Was having issues with constipation as per patient. Is not going every day like she used to. Pushing hard when she is on the toilet; she does endorse having some hemorrhoids but denies bleeding.  Has some nausea but controllable without medication. Patient doing well with fluid intake. As per , patient able to walk a mile outside.  Denies any pain beside pain to rectum from pushing with BMs.

## 2024-09-20 NOTE — ASSESSMENT
[With Patient/Caregiver] : With Patient/Caregiver [FreeTextEntry1] : 83 year old female with h/o HLD, Osteoporosis, pre-diabetes presents for a follow up after recent hospitalization.  Patient was admitted to [facility] 8/14/2024-8/16/2024 for complaints of 2-3 weeks of dry cough after endocrinologist Dr. Rashid ordered a CXR revealing pleural effusion. Follow up CT chest performed noting a lung mass. Biopsy and thoracentesis (900 ML removed) performed during admission. Patient is here to review pathology.   CT chest with contrast (8/14/24): Vertically oriented paraspinal or posterior mediastinal soft tissue mass posterior to the esophagus and partial or noncircumferential encasement of the mid to upper aspect of the descending thoracic aorta, part of which measures about 7.5 cm in transverse dimension. There is contiguous extension of the soft tissue mass adjacent to the left pleural surface likely extrapleural. Suggestion of extension of the soft tissue mass into the spinal canal along the left seventh neural foramen. There is mass effect and anterior displacement of some of the mediastinal structures.  1.6 cm retrocrural nodule adjacent to the lower descending thoracic aorta may represent a mildly enlarged lymph node.  Moderate-sized left pleural effusion. IMPRESSION: Large posterior mediastinal/paraspinal soft tissue mass with partial encasement of the mid to upper aspect of the descending thoracic aorta likely of neoplastic etiology. Differential diagnostic considerations include lymphoma.  Suggestion of extension of the soft tissue lesion into the spinal canal as described above. Dedicated thoracic spine MRI is recommended for complete evaluation.  Moderate-sized associated left pleural effusion with adjacent areas of compressive atelectasis.  Nonspecific 9 mm right middle lobe nodular groundglass opacity. A 3 month follow-up noncontrast chest CT is recommended for further evaluation.   Plan: -Pt's only symptom was that of a dry cough.  She has no f/ch/NS.  Has mild SOB.   --Pulmonary consultation appreciated.  Cough has resolved with mucinex and hycodan.   -Pt with a large posterior mediastinal mass; had biopsy by IR 8/16/24 and thoracentesis on 8/15/24.  -POSTERIOR MEDIASTINAL MASS, CORE BIOPSY (8//16/24): CD5- CD10- B-cell lymphoma, consistent with marginal zone lymphoma.  The ki-67 index is approximately 30%. -CBC and cmet, calcium all normal (8/16/24).  . -PET/CT (8/28/24):  in the chest, a large solid mass within the posterior mediastinum and left posterior medial chest abutting the thoracic spine, 7.7 cm x 8.4 cm and 10 cm in length, SUV max of 7.3.  The anterior surface abuts perhaps encasing descending thoracic aorta, moderate-large left pleural effusion.  Retrocrurual node, 1.1 cm, SUV max 4.4.  In abd pelvis, RP adenopathy with SUV max of 5.4, 2.7 x 3.7 cm; enlargement of L kidney suggesting perirenal and or parenchymal mass, SUV max 6. -TTE (8/15/24): normal EF and normal otherwise also.    -Had port placement -Will start Rituxan 375 mg/m2 along with Bendamustine 70 mg/m2 day 1,2 q28 days.  With Neulasta on day 3 after every cycle.  Side effects including but not limited to cytopenias, immunosuppression, neuropathy, nausea/vomiting, bowel habit changes, fatigue and severe, serious reactions/adverse effects that can be life threatening were discussed. Pt agreeable to tx and chemo consent signed (9/5/24).   -Contin allopurinol.   -To start on Bactrim SS daily (pt's have more compliance with this instead of DS TIW dosing) and acyclovir for infection prophylaxis.  -Anti-emetic meds put in PRN.    -Cycle 1 R-Mike given on 9/6/24.   -Rituxan 375 mg/m2 and Mike 70 mg/m2 day 1, 2 q28 d -Next cycle due on Oct 4th.   -Need CBC, cmet, uric acid, magnesium, phosphorus at each visit.     [AdvancecareDate] : 09/16/24

## 2024-09-20 NOTE — HISTORY OF PRESENT ILLNESS
[de-identified] : 83 year old female who presents for a follow up after recent hospitalization.  Patient was admitted to [facility] 8/14/2024-8/16/2024 for complaints of 2-3 weeks of dry cough after endocrinologist Dr. Rashid ordered a CXR revealing pleural effusion. Follow up CT chest performed noting a lung mass. Biopsy and thoracentesis (900 ML removed) performed during admission. Patient is here to review pathology.   CT chest with contrast (8/14/24): Vertically oriented paraspinal or posterior mediastinal soft tissue mass posterior to the esophagus and partial or noncircumferential encasement of the mid to upper aspect of the descending thoracic aorta, part of which measures about 7.5 cm in transverse dimension. There is contiguous extension of the soft tissue mass adjacent to the left pleural surface likely extrapleural. Suggestion of extension of the soft tissue mass into the spinal canal along the left seventh neural foramen. There is mass effect and anterior displacement of some of the mediastinal structures.  1.6 cm retrocrural nodule adjacent to the lower descending thoracic aorta may represent a mildly enlarged lymph node.  Moderate-sized left pleural effusion.  Family HX Maternal mother-bone cancer Maternal aunt-breast CA Maternal uncle-mesothelioma Father-precancerous polyps  Past medical history:   HLD Osteoporosis pre-diabetes  Past surgical history:   partial parathyroidectomy R rotator cuff surgery (14 years ago) R tibial surgery (w/ idris placed)  Social History:  No Smoking; Alcohol - 1 glass of wine a week; No Drugs; Marital Status -   Coded Allergies:        Horse Dander (Verified Allergy, Severe, TROUBLE BREATHING, 11/4/21)  [de-identified] : Patient presents today for follow up. Was having issues with constipation as per patient. Is not going every day like she used to. Pushing hard when she is on the toilet; she does endorse having some hemorrhoids but denies bleeding.  Has some nausea but controllable without medication. Patient doing well with fluid intake. As per , patient able to walk a mile outside.  Denies any pain beside pain to rectum from pushing with BMs.

## 2024-09-20 NOTE — HISTORY OF PRESENT ILLNESS
[de-identified] : 83 year old female who presents for a follow up after recent hospitalization.  Patient was admitted to [facility] 8/14/2024-8/16/2024 for complaints of 2-3 weeks of dry cough after endocrinologist Dr. Rashid ordered a CXR revealing pleural effusion. Follow up CT chest performed noting a lung mass. Biopsy and thoracentesis (900 ML removed) performed during admission. Patient is here to review pathology.   CT chest with contrast (8/14/24): Vertically oriented paraspinal or posterior mediastinal soft tissue mass posterior to the esophagus and partial or noncircumferential encasement of the mid to upper aspect of the descending thoracic aorta, part of which measures about 7.5 cm in transverse dimension. There is contiguous extension of the soft tissue mass adjacent to the left pleural surface likely extrapleural. Suggestion of extension of the soft tissue mass into the spinal canal along the left seventh neural foramen. There is mass effect and anterior displacement of some of the mediastinal structures.  1.6 cm retrocrural nodule adjacent to the lower descending thoracic aorta may represent a mildly enlarged lymph node.  Moderate-sized left pleural effusion.  Family HX Maternal mother-bone cancer Maternal aunt-breast CA Maternal uncle-mesothelioma Father-precancerous polyps  Past medical history:   HLD Osteoporosis pre-diabetes  Past surgical history:   partial parathyroidectomy R rotator cuff surgery (14 years ago) R tibial surgery (w/ idris placed)  Social History:  No Smoking; Alcohol - 1 glass of wine a week; No Drugs; Marital Status -   Coded Allergies:        Horse Dander (Verified Allergy, Severe, TROUBLE BREATHING, 11/4/21)  [de-identified] : Patient presents today for follow up. Was having issues with constipation as per patient. Is not going every day like she used to. Pushing hard when she is on the toilet; she does endorse having some hemorrhoids but denies bleeding.  Has some nausea but controllable without medication. Patient doing well with fluid intake. As per , patient able to walk a mile outside.  Denies any pain beside pain to rectum from pushing with BMs.

## 2024-09-20 NOTE — REVIEW OF SYSTEMS
[Shortness Of Breath] : shortness of breath [Cough] : cough [SOB on Exertion] : shortness of breath during exertion [Negative] : Allergic/Immunologic [Fatigue] : fatigue [Recent Change In Weight] : ~T recent weight change [Constipation] : constipation [Abdominal Pain] : no abdominal pain [Dysuria] : no dysuria [Confused] : no confusion [Dizziness] : no dizziness [FreeTextEntry2] : sleeping during the day more than usual; some weight loss [FreeTextEntry6] : continues to have dry cough occasionally  [FreeTextEntry7] : issues with not going to the bathroom every day - Metamucil

## 2024-10-02 ENCOUNTER — RESULT REVIEW (OUTPATIENT)
Age: 84
End: 2024-10-02

## 2024-10-02 ENCOUNTER — APPOINTMENT (OUTPATIENT)
Dept: HEMATOLOGY ONCOLOGY | Facility: CLINIC | Age: 84
End: 2024-10-02
Payer: MEDICARE

## 2024-10-02 ENCOUNTER — NON-APPOINTMENT (OUTPATIENT)
Age: 84
End: 2024-10-02

## 2024-10-02 VITALS
OXYGEN SATURATION: 96 % | HEIGHT: 59 IN | RESPIRATION RATE: 18 BRPM | WEIGHT: 140.7 LBS | TEMPERATURE: 98.4 F | SYSTOLIC BLOOD PRESSURE: 127 MMHG | BODY MASS INDEX: 28.36 KG/M2 | DIASTOLIC BLOOD PRESSURE: 74 MMHG | HEART RATE: 69 BPM

## 2024-10-02 PROCEDURE — 99214 OFFICE O/P EST MOD 30 MIN: CPT

## 2024-10-02 NOTE — BEGINNING OF VISIT
[0] : 2) Feeling down, depressed, or hopeless: Not at all (0) [[id number]] : 0 [Pain Scale: ___] : On a scale of 1-10, today the patient's pain is a(n) [unfilled]. [Former] : Former [> 15 Years] : > 15 Years [Date Discussed (MM/DD/YY): ___] : Discussed: [unfilled] [Reviewed updated] : Reviewed updated [Abdominal Pain] : no abdominal pain [Vomiting] : no vomiting [Constipation] : no constipation [de-identified] : stopped 60 yrs ago

## 2024-10-05 NOTE — ASSESSMENT
[With Patient/Caregiver] : With Patient/Caregiver [FreeTextEntry1] : 83 year old female with h/o HLD, Osteoporosis, pre-diabetes presents for a follow up after recent hospitalization.  Patient was admitted to [facility] 8/14/2024-8/16/2024 for complaints of 2-3 weeks of dry cough after endocrinologist Dr. Rashid ordered a CXR revealing pleural effusion. Follow up CT chest performed noting a lung mass. Biopsy and thoracentesis (900 ML removed) performed during admission. Patient is here to review pathology.   CT chest with contrast (8/14/24): Vertically oriented paraspinal or posterior mediastinal soft tissue mass posterior to the esophagus and partial or noncircumferential encasement of the mid to upper aspect of the descending thoracic aorta, part of which measures about 7.5 cm in transverse dimension. There is contiguous extension of the soft tissue mass adjacent to the left pleural surface likely extrapleural. Suggestion of extension of the soft tissue mass into the spinal canal along the left seventh neural foramen. There is mass effect and anterior displacement of some of the mediastinal structures.  1.6 cm retrocrural nodule adjacent to the lower descending thoracic aorta may represent a mildly enlarged lymph node.  Moderate-sized left pleural effusion. IMPRESSION: Large posterior mediastinal/paraspinal soft tissue mass with partial encasement of the mid to upper aspect of the descending thoracic aorta likely of neoplastic etiology. Differential diagnostic considerations include lymphoma.  Suggestion of extension of the soft tissue lesion into the spinal canal as described above. Dedicated thoracic spine MRI is recommended for complete evaluation.  Moderate-sized associated left pleural effusion with adjacent areas of compressive atelectasis.  Nonspecific 9 mm right middle lobe nodular groundglass opacity. A 3 month follow-up noncontrast chest CT is recommended for further evaluation.   Plan: -Pt's only symptom was that of a dry cough.  She has no f/ch/NS.  Has mild SOB.   --Pulmonary consultation appreciated.  Cough has resolved with mucinex and hycodan.   -Pt with a large posterior mediastinal mass; had biopsy by IR 8/16/24 and thoracentesis on 8/15/24.  -POSTERIOR MEDIASTINAL MASS, CORE BIOPSY (8//16/24): CD5- CD10- B-cell lymphoma, consistent with marginal zone lymphoma.  The ki-67 index is approximately 30%. -CBC and cmet, calcium all normal (8/16/24).  . -PET/CT (8/28/24):  in the chest, a large solid mass within the posterior mediastinum and left posterior medial chest abutting the thoracic spine, 7.7 cm x 8.4 cm and 10 cm in length, SUV max of 7.3.  The anterior surface abuts perhaps encasing descending thoracic aorta, moderate-large left pleural effusion.  Retrocrurual node, 1.1 cm, SUV max 4.4.  In abd pelvis, RP adenopathy with SUV max of 5.4, 2.7 x 3.7 cm; enlargement of L kidney suggesting perirenal and or parenchymal mass, SUV max 6. -TTE (8/15/24): normal EF and normal otherwise also.    -Had port placement -Will start Rituxan 375 mg/m2 along with Bendamustine 70 mg/m2 day 1,2 q28 days.  With Neulasta on day 3 after every cycle.  Side effects including but not limited to cytopenias, immunosuppression, neuropathy, nausea/vomiting, bowel habit changes, fatigue and severe, serious reactions/adverse effects that can be life threatening were discussed. Pt agreeable to tx and chemo consent signed (9/5/24).   -Contin allopurinol.   -To start on Bactrim SS daily (pt's have more compliance with this instead of DS TIW dosing) and acyclovir for infection prophylaxis.  -Anti-emetic meds put in PRN.    -Cycle 1 R-Imke given on 9/6/24.   -Rituxan 375 mg/m2 and Mike 70 mg/m2 day 1, 2 q28 d -Cycle #2 to be given on Oct 3 and 4th.  Onpro on day 3.   -Need CBC, cmet, uric acid, magnesium, phosphorus at each visit.     [AdvancecareDate] : 09/16/24

## 2024-10-05 NOTE — REVIEW OF SYSTEMS
[Fatigue] : fatigue [Recent Change In Weight] : ~T recent weight change [Shortness Of Breath] : shortness of breath [Cough] : cough [SOB on Exertion] : shortness of breath during exertion [Constipation] : constipation [Dry Eyes] : dryness of the eyes [Loss of Hearing] : loss of hearing [Negative] : Integumentary [Abdominal Pain] : no abdominal pain [Dysuria] : no dysuria [Confused] : no confusion [Dizziness] : no dizziness [FreeTextEntry2] : naps once  a day but no lnoger tired all time feel energy level is normal [FreeTextEntry3] : uses eye drops [FreeTextEntry4] : wear hearing aids [FreeTextEntry6] : continues to have dry cough occasionally  [FreeTextEntry7] : issues with not going to the bathroom every day - Metamucil

## 2024-10-05 NOTE — HISTORY OF PRESENT ILLNESS
[de-identified] : 83 year old female who presents for a follow up after recent hospitalization.  Patient was admitted to [facility] 8/14/2024-8/16/2024 for complaints of 2-3 weeks of dry cough after endocrinologist Dr. Rashid ordered a CXR revealing pleural effusion. Follow up CT chest performed noting a lung mass. Biopsy and thoracentesis (900 ML removed) performed during admission. Patient is here to review pathology.   CT chest with contrast (8/14/24): Vertically oriented paraspinal or posterior mediastinal soft tissue mass posterior to the esophagus and partial or noncircumferential encasement of the mid to upper aspect of the descending thoracic aorta, part of which measures about 7.5 cm in transverse dimension. There is contiguous extension of the soft tissue mass adjacent to the left pleural surface likely extrapleural. Suggestion of extension of the soft tissue mass into the spinal canal along the left seventh neural foramen. There is mass effect and anterior displacement of some of the mediastinal structures.  1.6 cm retrocrural nodule adjacent to the lower descending thoracic aorta may represent a mildly enlarged lymph node.  Moderate-sized left pleural effusion.  Family HX Maternal mother-bone cancer Maternal aunt-breast CA Maternal uncle-mesothelioma Father-precancerous polyps  Past medical history:   HLD Osteoporosis pre-diabetes  Past surgical history:   partial parathyroidectomy R rotator cuff surgery (14 years ago) R tibial surgery (w/ idris placed)  Social History:  No Smoking; Alcohol - 1 glass of wine a week; No Drugs; Marital Status -   Coded Allergies:        Horse Dander (Verified Allergy, Severe, TROUBLE BREATHING, 11/4/21)  [de-identified] : Constipation is resolved with metamucil twice a day Drinking 70 oz of water a day Occasional coughing but overall feels well Fatiqued got one week post treatmenty but then felt "normal"

## 2024-10-05 NOTE — HISTORY OF PRESENT ILLNESS
[de-identified] : 83 year old female who presents for a follow up after recent hospitalization.  Patient was admitted to [facility] 8/14/2024-8/16/2024 for complaints of 2-3 weeks of dry cough after endocrinologist Dr. Rashid ordered a CXR revealing pleural effusion. Follow up CT chest performed noting a lung mass. Biopsy and thoracentesis (900 ML removed) performed during admission. Patient is here to review pathology.   CT chest with contrast (8/14/24): Vertically oriented paraspinal or posterior mediastinal soft tissue mass posterior to the esophagus and partial or noncircumferential encasement of the mid to upper aspect of the descending thoracic aorta, part of which measures about 7.5 cm in transverse dimension. There is contiguous extension of the soft tissue mass adjacent to the left pleural surface likely extrapleural. Suggestion of extension of the soft tissue mass into the spinal canal along the left seventh neural foramen. There is mass effect and anterior displacement of some of the mediastinal structures.  1.6 cm retrocrural nodule adjacent to the lower descending thoracic aorta may represent a mildly enlarged lymph node.  Moderate-sized left pleural effusion.  Family HX Maternal mother-bone cancer Maternal aunt-breast CA Maternal uncle-mesothelioma Father-precancerous polyps  Past medical history:   HLD Osteoporosis pre-diabetes  Past surgical history:   partial parathyroidectomy R rotator cuff surgery (14 years ago) R tibial surgery (w/ idris placed)  Social History:  No Smoking; Alcohol - 1 glass of wine a week; No Drugs; Marital Status -   Coded Allergies:        Horse Dander (Verified Allergy, Severe, TROUBLE BREATHING, 11/4/21)  [de-identified] : Constipation is resolved with metamucil twice a day Drinking 70 oz of water a day Occasional coughing but overall feels well Fatiqued got one week post treatmenty but then felt "normal"

## 2024-10-05 NOTE — ASSESSMENT
[With Patient/Caregiver] : With Patient/Caregiver [FreeTextEntry1] : 83 year old female with h/o HLD, Osteoporosis, pre-diabetes presents for a follow up after recent hospitalization.  Patient was admitted to [facility] 8/14/2024-8/16/2024 for complaints of 2-3 weeks of dry cough after endocrinologist Dr. Rashid ordered a CXR revealing pleural effusion. Follow up CT chest performed noting a lung mass. Biopsy and thoracentesis (900 ML removed) performed during admission. Patient is here to review pathology.   CT chest with contrast (8/14/24): Vertically oriented paraspinal or posterior mediastinal soft tissue mass posterior to the esophagus and partial or noncircumferential encasement of the mid to upper aspect of the descending thoracic aorta, part of which measures about 7.5 cm in transverse dimension. There is contiguous extension of the soft tissue mass adjacent to the left pleural surface likely extrapleural. Suggestion of extension of the soft tissue mass into the spinal canal along the left seventh neural foramen. There is mass effect and anterior displacement of some of the mediastinal structures.  1.6 cm retrocrural nodule adjacent to the lower descending thoracic aorta may represent a mildly enlarged lymph node.  Moderate-sized left pleural effusion. IMPRESSION: Large posterior mediastinal/paraspinal soft tissue mass with partial encasement of the mid to upper aspect of the descending thoracic aorta likely of neoplastic etiology. Differential diagnostic considerations include lymphoma.  Suggestion of extension of the soft tissue lesion into the spinal canal as described above. Dedicated thoracic spine MRI is recommended for complete evaluation.  Moderate-sized associated left pleural effusion with adjacent areas of compressive atelectasis.  Nonspecific 9 mm right middle lobe nodular groundglass opacity. A 3 month follow-up noncontrast chest CT is recommended for further evaluation.   Plan: -Pt's only symptom was that of a dry cough.  She has no f/ch/NS.  Has mild SOB.   --Pulmonary consultation appreciated.  Cough has resolved with mucinex and hycodan.   -Pt with a large posterior mediastinal mass; had biopsy by IR 8/16/24 and thoracentesis on 8/15/24.  -POSTERIOR MEDIASTINAL MASS, CORE BIOPSY (8//16/24): CD5- CD10- B-cell lymphoma, consistent with marginal zone lymphoma.  The ki-67 index is approximately 30%. -CBC and cmet, calcium all normal (8/16/24).  . -PET/CT (8/28/24):  in the chest, a large solid mass within the posterior mediastinum and left posterior medial chest abutting the thoracic spine, 7.7 cm x 8.4 cm and 10 cm in length, SUV max of 7.3.  The anterior surface abuts perhaps encasing descending thoracic aorta, moderate-large left pleural effusion.  Retrocrurual node, 1.1 cm, SUV max 4.4.  In abd pelvis, RP adenopathy with SUV max of 5.4, 2.7 x 3.7 cm; enlargement of L kidney suggesting perirenal and or parenchymal mass, SUV max 6. -TTE (8/15/24): normal EF and normal otherwise also.    -Had port placement -Will start Rituxan 375 mg/m2 along with Bendamustine 70 mg/m2 day 1,2 q28 days.  With Neulasta on day 3 after every cycle.  Side effects including but not limited to cytopenias, immunosuppression, neuropathy, nausea/vomiting, bowel habit changes, fatigue and severe, serious reactions/adverse effects that can be life threatening were discussed. Pt agreeable to tx and chemo consent signed (9/5/24).   -Contin allopurinol.   -To start on Bactrim SS daily (pt's have more compliance with this instead of DS TIW dosing) and acyclovir for infection prophylaxis.  -Anti-emetic meds put in PRN.    -Cycle 1 R-Mike given on 9/6/24.   -Rituxan 375 mg/m2 and Mike 70 mg/m2 day 1, 2 q28 d -Cycle #2 to be given on Oct 3 and 4th.  Onpro on day 3.   -Need CBC, cmet, uric acid, magnesium, phosphorus at each visit.     [AdvancecareDate] : 09/16/24

## 2024-10-05 NOTE — ASSESSMENT
[With Patient/Caregiver] : With Patient/Caregiver [FreeTextEntry1] : 83 year old female with h/o HLD, Osteoporosis, pre-diabetes presents for a follow up after recent hospitalization.  Patient was admitted to [facility] 8/14/2024-8/16/2024 for complaints of 2-3 weeks of dry cough after endocrinologist Dr. Rashid ordered a CXR revealing pleural effusion. Follow up CT chest performed noting a lung mass. Biopsy and thoracentesis (900 ML removed) performed during admission. Patient is here to review pathology.   CT chest with contrast (8/14/24): Vertically oriented paraspinal or posterior mediastinal soft tissue mass posterior to the esophagus and partial or noncircumferential encasement of the mid to upper aspect of the descending thoracic aorta, part of which measures about 7.5 cm in transverse dimension. There is contiguous extension of the soft tissue mass adjacent to the left pleural surface likely extrapleural. Suggestion of extension of the soft tissue mass into the spinal canal along the left seventh neural foramen. There is mass effect and anterior displacement of some of the mediastinal structures.  1.6 cm retrocrural nodule adjacent to the lower descending thoracic aorta may represent a mildly enlarged lymph node.  Moderate-sized left pleural effusion. IMPRESSION: Large posterior mediastinal/paraspinal soft tissue mass with partial encasement of the mid to upper aspect of the descending thoracic aorta likely of neoplastic etiology. Differential diagnostic considerations include lymphoma.  Suggestion of extension of the soft tissue lesion into the spinal canal as described above. Dedicated thoracic spine MRI is recommended for complete evaluation.  Moderate-sized associated left pleural effusion with adjacent areas of compressive atelectasis.  Nonspecific 9 mm right middle lobe nodular groundglass opacity. A 3 month follow-up noncontrast chest CT is recommended for further evaluation.   Plan: -Pt's only symptom was that of a dry cough.  She has no f/ch/NS.  Has mild SOB.   --Pulmonary consultation appreciated.  Cough has resolved with mucinex and hycodan.   -Pt with a large posterior mediastinal mass; had biopsy by IR 8/16/24 and thoracentesis on 8/15/24.  -POSTERIOR MEDIASTINAL MASS, CORE BIOPSY (8//16/24): CD5- CD10- B-cell lymphoma, consistent with marginal zone lymphoma.  The ki-67 index is approximately 30%. -CBC and cmet, calcium all normal (8/16/24).  . -PET/CT (8/28/24):  in the chest, a large solid mass within the posterior mediastinum and left posterior medial chest abutting the thoracic spine, 7.7 cm x 8.4 cm and 10 cm in length, SUV max of 7.3.  The anterior surface abuts perhaps encasing descending thoracic aorta, moderate-large left pleural effusion.  Retrocrurual node, 1.1 cm, SUV max 4.4.  In abd pelvis, RP adenopathy with SUV max of 5.4, 2.7 x 3.7 cm; enlargement of L kidney suggesting perirenal and or parenchymal mass, SUV max 6. -TTE (8/15/24): normal EF and normal otherwise also.    -Had port placement -Will start Rituxan 375 mg/m2 along with Bendamustine 70 mg/m2 day 1,2 q28 days.  With Neulasta on day 3 after every cycle.  Side effects including but not limited to cytopenias, immunosuppression, neuropathy, nausea/vomiting, bowel habit changes, fatigue and severe, serious reactions/adverse effects that can be life threatening were discussed. Pt agreeable to tx and chemo consent signed (9/5/24).   -Contin allopurinol.   -To start on Bactrim SS daily (pt's have more compliance with this instead of DS TIW dosing) and acyclovir for infection prophylaxis.  -Anti-emetic meds put in PRN.    -Cycle 1 R-Mike given on 9/6/24.   -Rituxan 375 mg/m2 and Mike 70 mg/m2 day 1, 2 q28 d -Cycle #2 to be given on Oct 3 and 4th.  Onpro on day 3.   -Need CBC, cmet, uric acid, magnesium, phosphorus at each visit.     [AdvancecareDate] : 09/16/24

## 2024-10-05 NOTE — HISTORY OF PRESENT ILLNESS
[de-identified] : 83 year old female who presents for a follow up after recent hospitalization.  Patient was admitted to [facility] 8/14/2024-8/16/2024 for complaints of 2-3 weeks of dry cough after endocrinologist Dr. Rashid ordered a CXR revealing pleural effusion. Follow up CT chest performed noting a lung mass. Biopsy and thoracentesis (900 ML removed) performed during admission. Patient is here to review pathology.   CT chest with contrast (8/14/24): Vertically oriented paraspinal or posterior mediastinal soft tissue mass posterior to the esophagus and partial or noncircumferential encasement of the mid to upper aspect of the descending thoracic aorta, part of which measures about 7.5 cm in transverse dimension. There is contiguous extension of the soft tissue mass adjacent to the left pleural surface likely extrapleural. Suggestion of extension of the soft tissue mass into the spinal canal along the left seventh neural foramen. There is mass effect and anterior displacement of some of the mediastinal structures.  1.6 cm retrocrural nodule adjacent to the lower descending thoracic aorta may represent a mildly enlarged lymph node.  Moderate-sized left pleural effusion.  Family HX Maternal mother-bone cancer Maternal aunt-breast CA Maternal uncle-mesothelioma Father-precancerous polyps  Past medical history:   HLD Osteoporosis pre-diabetes  Past surgical history:   partial parathyroidectomy R rotator cuff surgery (14 years ago) R tibial surgery (w/ idris placed)  Social History:  No Smoking; Alcohol - 1 glass of wine a week; No Drugs; Marital Status -   Coded Allergies:        Horse Dander (Verified Allergy, Severe, TROUBLE BREATHING, 11/4/21)  [de-identified] : Constipation is resolved with metamucil twice a day Drinking 70 oz of water a day Occasional coughing but overall feels well Fatiqued got one week post treatmenty but then felt "normal"

## 2024-10-14 ENCOUNTER — RESULT REVIEW (OUTPATIENT)
Age: 84
End: 2024-10-14

## 2024-10-14 ENCOUNTER — APPOINTMENT (OUTPATIENT)
Dept: HEMATOLOGY ONCOLOGY | Facility: CLINIC | Age: 84
End: 2024-10-14
Payer: MEDICARE

## 2024-10-14 VITALS
BODY MASS INDEX: 27.96 KG/M2 | HEART RATE: 70 BPM | WEIGHT: 138.7 LBS | HEIGHT: 59 IN | OXYGEN SATURATION: 96 % | DIASTOLIC BLOOD PRESSURE: 73 MMHG | RESPIRATION RATE: 18 BRPM | SYSTOLIC BLOOD PRESSURE: 133 MMHG | TEMPERATURE: 97.6 F

## 2024-10-14 PROCEDURE — 99215 OFFICE O/P EST HI 40 MIN: CPT

## 2024-10-28 ENCOUNTER — RESULT REVIEW (OUTPATIENT)
Age: 84
End: 2024-10-28

## 2024-10-31 ENCOUNTER — RESULT REVIEW (OUTPATIENT)
Age: 84
End: 2024-10-31

## 2024-11-04 ENCOUNTER — RESULT REVIEW (OUTPATIENT)
Age: 84
End: 2024-11-04

## 2024-11-04 ENCOUNTER — APPOINTMENT (OUTPATIENT)
Dept: HEMATOLOGY ONCOLOGY | Facility: CLINIC | Age: 84
End: 2024-11-04
Payer: MEDICARE

## 2024-11-04 VITALS
BODY MASS INDEX: 26.28 KG/M2 | RESPIRATION RATE: 18 BRPM | DIASTOLIC BLOOD PRESSURE: 75 MMHG | TEMPERATURE: 97.5 F | SYSTOLIC BLOOD PRESSURE: 136 MMHG | WEIGHT: 130.38 LBS | HEART RATE: 73 BPM | HEIGHT: 59 IN | OXYGEN SATURATION: 96 %

## 2024-11-04 DIAGNOSIS — C85.19 UNSPECIFIED B-CELL LYMPHOMA, EXTRANODAL AND SOLID ORGAN SITES: ICD-10-CM

## 2024-11-04 PROCEDURE — 99214 OFFICE O/P EST MOD 30 MIN: CPT

## 2024-11-06 ENCOUNTER — NON-APPOINTMENT (OUTPATIENT)
Age: 84
End: 2024-11-06

## 2024-11-26 ENCOUNTER — RESULT REVIEW (OUTPATIENT)
Age: 84
End: 2024-11-26

## 2024-12-09 ENCOUNTER — APPOINTMENT (OUTPATIENT)
Dept: HEMATOLOGY ONCOLOGY | Facility: CLINIC | Age: 84
End: 2024-12-09
Payer: MEDICARE

## 2024-12-09 ENCOUNTER — RESULT REVIEW (OUTPATIENT)
Age: 84
End: 2024-12-09

## 2024-12-09 VITALS
HEIGHT: 59 IN | WEIGHT: 140 LBS | BODY MASS INDEX: 28.22 KG/M2 | OXYGEN SATURATION: 97 % | HEART RATE: 71 BPM | RESPIRATION RATE: 16 BRPM | DIASTOLIC BLOOD PRESSURE: 74 MMHG | TEMPERATURE: 97.8 F | SYSTOLIC BLOOD PRESSURE: 107 MMHG

## 2024-12-09 DIAGNOSIS — C85.19 UNSPECIFIED B-CELL LYMPHOMA, EXTRANODAL AND SOLID ORGAN SITES: ICD-10-CM

## 2024-12-09 PROCEDURE — 99213 OFFICE O/P EST LOW 20 MIN: CPT

## 2024-12-30 ENCOUNTER — RESULT REVIEW (OUTPATIENT)
Age: 84
End: 2024-12-30

## 2024-12-30 ENCOUNTER — APPOINTMENT (OUTPATIENT)
Dept: HEMATOLOGY ONCOLOGY | Facility: CLINIC | Age: 84
End: 2024-12-30
Payer: MEDICARE

## 2024-12-30 VITALS
SYSTOLIC BLOOD PRESSURE: 121 MMHG | RESPIRATION RATE: 16 BRPM | TEMPERATURE: 97.8 F | DIASTOLIC BLOOD PRESSURE: 77 MMHG | HEART RATE: 67 BPM | BODY MASS INDEX: 28.63 KG/M2 | WEIGHT: 142 LBS | HEIGHT: 59 IN | OXYGEN SATURATION: 97 %

## 2024-12-30 DIAGNOSIS — C85.19 UNSPECIFIED B-CELL LYMPHOMA, EXTRANODAL AND SOLID ORGAN SITES: ICD-10-CM

## 2024-12-30 PROCEDURE — 99214 OFFICE O/P EST MOD 30 MIN: CPT

## 2025-03-07 DIAGNOSIS — E21.0 PRIMARY HYPERPARATHYROIDISM: ICD-10-CM

## 2025-03-10 ENCOUNTER — APPOINTMENT (OUTPATIENT)
Dept: HEMATOLOGY ONCOLOGY | Facility: CLINIC | Age: 85
End: 2025-03-10
Payer: MEDICARE

## 2025-03-10 ENCOUNTER — RESULT REVIEW (OUTPATIENT)
Age: 85
End: 2025-03-10

## 2025-03-10 VITALS
BODY MASS INDEX: 29.52 KG/M2 | DIASTOLIC BLOOD PRESSURE: 77 MMHG | WEIGHT: 146.4 LBS | TEMPERATURE: 97.2 F | SYSTOLIC BLOOD PRESSURE: 145 MMHG | HEART RATE: 59 BPM | OXYGEN SATURATION: 97 % | RESPIRATION RATE: 16 BRPM | HEIGHT: 59 IN

## 2025-03-10 DIAGNOSIS — C85.19 UNSPECIFIED B-CELL LYMPHOMA, EXTRANODAL AND SOLID ORGAN SITES: ICD-10-CM

## 2025-03-10 PROCEDURE — 99214 OFFICE O/P EST MOD 30 MIN: CPT

## 2025-05-17 ENCOUNTER — APPOINTMENT (OUTPATIENT)
Dept: AFTER HOURS CARE | Facility: EMERGENCY ROOM | Age: 85
End: 2025-05-17

## 2025-05-17 DIAGNOSIS — R68.89 OTHER GENERAL SYMPTOMS AND SIGNS: ICD-10-CM

## 2025-05-17 PROCEDURE — 99215 OFFICE O/P EST HI 40 MIN: CPT | Mod: 2W

## 2025-06-04 DIAGNOSIS — E21.0 PRIMARY HYPERPARATHYROIDISM: ICD-10-CM

## 2025-06-09 ENCOUNTER — RESULT REVIEW (OUTPATIENT)
Age: 85
End: 2025-06-09

## 2025-06-09 ENCOUNTER — APPOINTMENT (OUTPATIENT)
Dept: HEMATOLOGY ONCOLOGY | Facility: CLINIC | Age: 85
End: 2025-06-09
Payer: MEDICARE

## 2025-06-09 VITALS
OXYGEN SATURATION: 96 % | RESPIRATION RATE: 17 BRPM | HEIGHT: 59 IN | DIASTOLIC BLOOD PRESSURE: 81 MMHG | BODY MASS INDEX: 30.24 KG/M2 | WEIGHT: 150 LBS | TEMPERATURE: 97 F | HEART RATE: 64 BPM | SYSTOLIC BLOOD PRESSURE: 135 MMHG

## 2025-06-09 PROCEDURE — 99214 OFFICE O/P EST MOD 30 MIN: CPT

## 2025-09-08 DIAGNOSIS — E21.0 PRIMARY HYPERPARATHYROIDISM: ICD-10-CM

## 2025-09-11 ENCOUNTER — RESULT REVIEW (OUTPATIENT)
Age: 85
End: 2025-09-11

## 2025-09-11 ENCOUNTER — APPOINTMENT (OUTPATIENT)
Dept: HEMATOLOGY ONCOLOGY | Facility: CLINIC | Age: 85
End: 2025-09-11
Payer: MEDICARE

## 2025-09-11 VITALS
RESPIRATION RATE: 16 BRPM | OXYGEN SATURATION: 95 % | HEIGHT: 59 IN | WEIGHT: 149 LBS | SYSTOLIC BLOOD PRESSURE: 111 MMHG | BODY MASS INDEX: 30.04 KG/M2 | HEART RATE: 61 BPM | DIASTOLIC BLOOD PRESSURE: 73 MMHG | TEMPERATURE: 97.8 F

## 2025-09-11 DIAGNOSIS — C85.19 UNSPECIFIED B-CELL LYMPHOMA, EXTRANODAL AND SOLID ORGAN SITES: ICD-10-CM

## 2025-09-11 PROCEDURE — G2211 COMPLEX E/M VISIT ADD ON: CPT

## 2025-09-11 PROCEDURE — 99214 OFFICE O/P EST MOD 30 MIN: CPT
